# Patient Record
Sex: FEMALE | Race: WHITE | NOT HISPANIC OR LATINO | Employment: OTHER | URBAN - METROPOLITAN AREA
[De-identification: names, ages, dates, MRNs, and addresses within clinical notes are randomized per-mention and may not be internally consistent; named-entity substitution may affect disease eponyms.]

---

## 2023-11-02 ENCOUNTER — APPOINTMENT (OUTPATIENT)
Dept: RADIOLOGY | Facility: HOSPITAL | Age: 88
End: 2023-11-02
Payer: MEDICARE

## 2023-11-02 ENCOUNTER — HOSPITAL ENCOUNTER (EMERGENCY)
Facility: HOSPITAL | Age: 88
Discharge: HOME | End: 2023-11-02
Attending: STUDENT IN AN ORGANIZED HEALTH CARE EDUCATION/TRAINING PROGRAM
Payer: MEDICARE

## 2023-11-02 VITALS
TEMPERATURE: 98.2 F | HEART RATE: 73 BPM | WEIGHT: 155 LBS | OXYGEN SATURATION: 98 % | DIASTOLIC BLOOD PRESSURE: 77 MMHG | SYSTOLIC BLOOD PRESSURE: 151 MMHG | BODY MASS INDEX: 28.52 KG/M2 | RESPIRATION RATE: 16 BRPM | HEIGHT: 62 IN

## 2023-11-02 DIAGNOSIS — R10.9 FLANK PAIN: Primary | ICD-10-CM

## 2023-11-02 LAB
ALBUMIN SERPL BCP-MCNC: 4.1 G/DL (ref 3.4–5)
ALP SERPL-CCNC: 64 U/L (ref 33–136)
ALT SERPL W P-5'-P-CCNC: 13 U/L (ref 7–45)
ANION GAP SERPL CALC-SCNC: 12 MMOL/L (ref 10–20)
APPEARANCE UR: CLEAR
AST SERPL W P-5'-P-CCNC: 15 U/L (ref 9–39)
BASOPHILS # BLD AUTO: 0.05 X10*3/UL (ref 0–0.1)
BASOPHILS NFR BLD AUTO: 0.6 %
BILIRUB SERPL-MCNC: 0.3 MG/DL (ref 0–1.2)
BILIRUB UR STRIP.AUTO-MCNC: NEGATIVE MG/DL
BUN SERPL-MCNC: 20 MG/DL (ref 6–23)
CALCIUM SERPL-MCNC: 9.9 MG/DL (ref 8.6–10.3)
CARDIAC TROPONIN I PNL SERPL HS: 10 NG/L (ref 0–13)
CHLORIDE SERPL-SCNC: 99 MMOL/L (ref 98–107)
CO2 SERPL-SCNC: 29 MMOL/L (ref 21–32)
COLOR UR: ABNORMAL
CREAT SERPL-MCNC: 1.2 MG/DL (ref 0.5–1.05)
EOSINOPHIL # BLD AUTO: 0.1 X10*3/UL (ref 0–0.4)
EOSINOPHIL NFR BLD AUTO: 1.1 %
ERYTHROCYTE [DISTWIDTH] IN BLOOD BY AUTOMATED COUNT: 13.3 % (ref 11.5–14.5)
GFR SERPL CREATININE-BSD FRML MDRD: 44 ML/MIN/1.73M*2
GLUCOSE SERPL-MCNC: 230 MG/DL (ref 74–99)
GLUCOSE UR STRIP.AUTO-MCNC: ABNORMAL MG/DL
HCT VFR BLD AUTO: 38.1 % (ref 36–46)
HGB BLD-MCNC: 12.4 G/DL (ref 12–16)
HOLD SPECIMEN: NORMAL
IMM GRANULOCYTES # BLD AUTO: 0.03 X10*3/UL (ref 0–0.5)
IMM GRANULOCYTES NFR BLD AUTO: 0.3 % (ref 0–0.9)
INR PPP: 1 (ref 0.9–1.1)
KETONES UR STRIP.AUTO-MCNC: NEGATIVE MG/DL
LACTATE SERPL-SCNC: 1.7 MMOL/L (ref 0.4–2)
LEUKOCYTE ESTERASE UR QL STRIP.AUTO: NEGATIVE
LIPASE SERPL-CCNC: 95 U/L (ref 9–82)
LYMPHOCYTES # BLD AUTO: 3.41 X10*3/UL (ref 0.8–3)
LYMPHOCYTES NFR BLD AUTO: 38.9 %
MCH RBC QN AUTO: 28.6 PG (ref 26–34)
MCHC RBC AUTO-ENTMCNC: 32.5 G/DL (ref 32–36)
MCV RBC AUTO: 88 FL (ref 80–100)
MONOCYTES # BLD AUTO: 0.88 X10*3/UL (ref 0.05–0.8)
MONOCYTES NFR BLD AUTO: 10 %
NEUTROPHILS # BLD AUTO: 4.29 X10*3/UL (ref 1.6–5.5)
NEUTROPHILS NFR BLD AUTO: 49.1 %
NITRITE UR QL STRIP.AUTO: NEGATIVE
NRBC BLD-RTO: 0 /100 WBCS (ref 0–0)
PH UR STRIP.AUTO: 6 [PH]
PLATELET # BLD AUTO: 293 X10*3/UL (ref 150–450)
POTASSIUM SERPL-SCNC: 3.8 MMOL/L (ref 3.5–5.3)
PROT SERPL-MCNC: 7 G/DL (ref 6.4–8.2)
PROT UR STRIP.AUTO-MCNC: NEGATIVE MG/DL
PROTHROMBIN TIME: 11.1 SECONDS (ref 9.8–12.8)
RBC # BLD AUTO: 4.33 X10*6/UL (ref 4–5.2)
RBC # UR STRIP.AUTO: NEGATIVE /UL
SODIUM SERPL-SCNC: 136 MMOL/L (ref 136–145)
SP GR UR STRIP.AUTO: 1
UROBILINOGEN UR STRIP.AUTO-MCNC: <2 MG/DL
WBC # BLD AUTO: 8.8 X10*3/UL (ref 4.4–11.3)

## 2023-11-02 PROCEDURE — 83605 ASSAY OF LACTIC ACID: CPT | Performed by: PHYSICIAN ASSISTANT

## 2023-11-02 PROCEDURE — 85025 COMPLETE CBC W/AUTO DIFF WBC: CPT | Performed by: PHYSICIAN ASSISTANT

## 2023-11-02 PROCEDURE — 36415 COLL VENOUS BLD VENIPUNCTURE: CPT | Performed by: PHYSICIAN ASSISTANT

## 2023-11-02 PROCEDURE — 96374 THER/PROPH/DIAG INJ IV PUSH: CPT | Mod: 59

## 2023-11-02 PROCEDURE — 2500000004 HC RX 250 GENERAL PHARMACY W/ HCPCS (ALT 636 FOR OP/ED): Performed by: PHYSICIAN ASSISTANT

## 2023-11-02 PROCEDURE — 81003 URINALYSIS AUTO W/O SCOPE: CPT | Performed by: PHYSICIAN ASSISTANT

## 2023-11-02 PROCEDURE — 74177 CT ABD & PELVIS W/CONTRAST: CPT

## 2023-11-02 PROCEDURE — 99284 EMERGENCY DEPT VISIT MOD MDM: CPT | Mod: 25 | Performed by: STUDENT IN AN ORGANIZED HEALTH CARE EDUCATION/TRAINING PROGRAM

## 2023-11-02 PROCEDURE — 80053 COMPREHEN METABOLIC PANEL: CPT | Performed by: PHYSICIAN ASSISTANT

## 2023-11-02 PROCEDURE — 2550000001 HC RX 255 CONTRASTS: Performed by: PHYSICIAN ASSISTANT

## 2023-11-02 PROCEDURE — 84484 ASSAY OF TROPONIN QUANT: CPT | Performed by: PHYSICIAN ASSISTANT

## 2023-11-02 PROCEDURE — 83690 ASSAY OF LIPASE: CPT | Performed by: PHYSICIAN ASSISTANT

## 2023-11-02 PROCEDURE — 85610 PROTHROMBIN TIME: CPT | Performed by: PHYSICIAN ASSISTANT

## 2023-11-02 PROCEDURE — 74177 CT ABD & PELVIS W/CONTRAST: CPT | Mod: FOREIGN READ | Performed by: RADIOLOGY

## 2023-11-02 PROCEDURE — 96375 TX/PRO/DX INJ NEW DRUG ADDON: CPT

## 2023-11-02 PROCEDURE — 96361 HYDRATE IV INFUSION ADD-ON: CPT

## 2023-11-02 RX ORDER — VALACYCLOVIR HYDROCHLORIDE 1 G/1
1000 TABLET, FILM COATED ORAL 3 TIMES DAILY
Qty: 21 TABLET | Refills: 0 | Status: SHIPPED | OUTPATIENT
Start: 2023-11-02 | End: 2023-11-09

## 2023-11-02 RX ORDER — ONDANSETRON HYDROCHLORIDE 2 MG/ML
4 INJECTION, SOLUTION INTRAVENOUS ONCE
Status: DISCONTINUED | OUTPATIENT
Start: 2023-11-02 | End: 2023-11-02 | Stop reason: HOSPADM

## 2023-11-02 RX ORDER — LIDOCAINE 50 MG/G
1 PATCH TOPICAL DAILY
Qty: 5 PATCH | Refills: 0 | Status: SHIPPED | OUTPATIENT
Start: 2023-11-02 | End: 2023-11-07

## 2023-11-02 RX ORDER — HYDROCODONE BITARTRATE AND ACETAMINOPHEN 5; 325 MG/1; MG/1
1 TABLET ORAL 3 TIMES DAILY PRN
Qty: 9 TABLET | Refills: 0 | Status: SHIPPED | OUTPATIENT
Start: 2023-11-02 | End: 2023-11-05

## 2023-11-02 RX ORDER — LORAZEPAM 2 MG/ML
0.25 INJECTION INTRAMUSCULAR ONCE
Status: COMPLETED | OUTPATIENT
Start: 2023-11-02 | End: 2023-11-02

## 2023-11-02 RX ORDER — MORPHINE SULFATE 4 MG/ML
4 INJECTION INTRAVENOUS ONCE
Status: COMPLETED | OUTPATIENT
Start: 2023-11-02 | End: 2023-11-02

## 2023-11-02 RX ADMIN — SODIUM CHLORIDE 1000 ML: 9 INJECTION, SOLUTION INTRAVENOUS at 17:13

## 2023-11-02 RX ADMIN — MORPHINE SULFATE 4 MG: 4 INJECTION INTRAVENOUS at 17:10

## 2023-11-02 RX ADMIN — LORAZEPAM 0.26 MG: 2 INJECTION INTRAMUSCULAR; INTRAVENOUS at 17:10

## 2023-11-02 RX ADMIN — IOHEXOL 75 ML: 350 INJECTION, SOLUTION INTRAVENOUS at 18:32

## 2023-11-02 ASSESSMENT — LIFESTYLE VARIABLES
HAVE PEOPLE ANNOYED YOU BY CRITICIZING YOUR DRINKING: NO
EVER FELT BAD OR GUILTY ABOUT YOUR DRINKING: NO
HAVE YOU EVER FELT YOU SHOULD CUT DOWN ON YOUR DRINKING: NO
EVER HAD A DRINK FIRST THING IN THE MORNING TO STEADY YOUR NERVES TO GET RID OF A HANGOVER: NO
REASON UNABLE TO ASSESS: NO

## 2023-11-02 ASSESSMENT — PAIN SCALES - GENERAL: PAINLEVEL_OUTOF10: 9

## 2023-11-02 ASSESSMENT — COLUMBIA-SUICIDE SEVERITY RATING SCALE - C-SSRS
1. IN THE PAST MONTH, HAVE YOU WISHED YOU WERE DEAD OR WISHED YOU COULD GO TO SLEEP AND NOT WAKE UP?: NO
2. HAVE YOU ACTUALLY HAD ANY THOUGHTS OF KILLING YOURSELF?: NO
6. HAVE YOU EVER DONE ANYTHING, STARTED TO DO ANYTHING, OR PREPARED TO DO ANYTHING TO END YOUR LIFE?: NO

## 2023-11-02 ASSESSMENT — PAIN - FUNCTIONAL ASSESSMENT: PAIN_FUNCTIONAL_ASSESSMENT: 0-10

## 2023-11-03 NOTE — ED PROVIDER NOTES
HPI   Chief Complaint   Patient presents with    Flank Pain     Pt presents to the ER with flank pain that has been going on since Sunday. Pt states that the pain is a 10/10. Pain comes and goes. Pt states that she hasn't had any trouble with urination. PT has had diarrhea.        This is a 88-year-old female with PMH CAD on Plavix, DM presenting for evaluation of atraumatic left-sided flank pain x4 days.  No obvious aggravating or alleviating factors.  Radiates on horizontal plane from the back to the anterior abdomen.  States it feels similar to prior episodes of having shingles but there is no rash. Denies fevers, chills, chest pain, shortness of breath, nausea, vomiting, diarrhea, constipation, hematochezia, melena, urinary symptoms, vaginal symptoms.                          [place] Coma Scale Score: 15                  Patient History   No past medical history on file.  Past Surgical History:   Procedure Laterality Date    OTHER SURGICAL HISTORY  11/30/2018    Coronary artery bypass graft    OTHER SURGICAL HISTORY  11/30/2018    Cardiac catheterization     No family history on file.  Social History     Tobacco Use    Smoking status: Not on file    Smokeless tobacco: Not on file   Substance Use Topics    Alcohol use: Not on file    Drug use: Not on file       Physical Exam   ED Triage Vitals [11/02/23 1619]   Temp Heart Rate Resp BP   36.8 °C (98.2 °F) 89 18 136/68      SpO2 Temp Source Heart Rate Source Patient Position   98 % Tympanic Monitor Sitting      BP Location FiO2 (%)     Left arm --       Physical Exam    General: Vitals noted. Afebrile. No apparent distress  EENT: Sclerae anicteric  Cardiac: Regular rate and rhythm.  Grade 2/6 systolic murmur heard best at the LSB.  Pulmonary: Lungs clear bilaterally with good aeration  Abdomen: Soft. Nontender. No rebound. No guarding  : No CVA tenderness. exam deferred  Extremities: No peripheral edema  Skin: No rash on abdomen  Neuro: No focal neurologic  deficits    ED Course & MDM   ED Course as of 11/02/23 2118   Thu Nov 02, 2023 1916 CT abdomen pelvis w IV contrast [BW]      ED Course User Index  [BW] Amado Turpin PA-C         Diagnoses as of 11/02/23 2118   Flank pain       Medical Decision Making  DDx: Ureterolithiasis, retroperitoneal abscess, colitis, obstruction, pyelonephritis, atypical cardiac presentation  EKG Interpreted by me: NSR.  Rate 82.  RBBB.  Bifascicular block.  QTc 479 ms.  LAD.  No STEMI.    88-year-old female with PMH CAD on Plavix, DM presenting for evaluation of atraumatic left-sided flank pain x4 days.  Visibly nontoxic-appearing no apparent distress.  No reproducible tenderness on exam.  Vital signs stable.  Patient was given IV normal saline, IV morphine, IV Zofran.  Was also given Ativan out of concern for anxiety about her scan.  Lipase is nonspecifically elevated at 95.  Creatinine 1.2.  Glucose 230 without any evidence of DKA negative troponin.  Unremarkable urinalysis.  CBC shows no leukocytosis or anemia.  CT scan shows small amount of subcutaneous edema in the subcutaneous tissues of the left and right the midline in the upper pelvis but otherwise no acute abnormalities noted by the radiologist.  These are likely secondary to insulin injections in the abdomen.  Unclear if this is herpetic pain presenting before the typical shingles rash so the patient will be treated with Valtrex and also given lidocaine patches and Norco for pain control.  Follow-up with PCP.  Of course return to the nearest emergency department if any concerns or new or worsening symptoms.  This visit was staffed with the attending physician Dr. Finley.    Impression: See diagnosis    Disposition: Discharge      Disclaimer: This note was dictated using speech recognition software. An attempt at proofreading was made to minimize errors. Minor errors in transcription may be present. Please call if questions.        Procedure  Procedures     Amado Turpin  JONA  11/02/23 2123

## 2025-08-05 ENCOUNTER — APPOINTMENT (OUTPATIENT)
Dept: RADIOLOGY | Facility: HOSPITAL | Age: OVER 89
DRG: 372 | End: 2025-08-05
Payer: MEDICARE

## 2025-08-05 ENCOUNTER — HOSPITAL ENCOUNTER (INPATIENT)
Facility: HOSPITAL | Age: OVER 89
End: 2025-08-05
Attending: EMERGENCY MEDICINE | Admitting: INTERNAL MEDICINE
Payer: MEDICARE

## 2025-08-05 DIAGNOSIS — L02.91 ABSCESS: ICD-10-CM

## 2025-08-05 DIAGNOSIS — E87.1 HYPONATREMIA: ICD-10-CM

## 2025-08-05 DIAGNOSIS — K35.32 PERFORATED APPENDICITIS: ICD-10-CM

## 2025-08-05 DIAGNOSIS — R10.30 LOWER ABDOMINAL PAIN: Primary | ICD-10-CM

## 2025-08-05 DIAGNOSIS — K35.32 APPENDICITIS WITH PERFORATION: ICD-10-CM

## 2025-08-05 PROBLEM — E11.39 TYPE 2 DIABETES MELLITUS WITH OPHTHALMIC COMPLICATION (MULTI): Chronic | Status: ACTIVE | Noted: 2025-08-05

## 2025-08-05 LAB
ABO GROUP (TYPE) IN BLOOD: NORMAL
ABO GROUP (TYPE) IN BLOOD: NORMAL
ALBUMIN SERPL BCP-MCNC: 3.5 G/DL (ref 3.4–5)
ALP SERPL-CCNC: 63 U/L (ref 33–136)
ALT SERPL W P-5'-P-CCNC: 8 U/L (ref 7–45)
ANION GAP SERPL CALC-SCNC: 15 MMOL/L (ref 10–20)
ANTIBODY SCREEN: NORMAL
APPEARANCE UR: CLEAR
AST SERPL W P-5'-P-CCNC: 10 U/L (ref 9–39)
BASOPHILS # BLD AUTO: 0.03 X10*3/UL (ref 0–0.1)
BASOPHILS NFR BLD AUTO: 0.2 %
BILIRUB SERPL-MCNC: 0.7 MG/DL (ref 0–1.2)
BILIRUB UR STRIP.AUTO-MCNC: NEGATIVE MG/DL
BUN SERPL-MCNC: 24 MG/DL (ref 6–23)
CALCIUM SERPL-MCNC: 9.2 MG/DL (ref 8.6–10.3)
CHLORIDE SERPL-SCNC: 91 MMOL/L (ref 98–107)
CO2 SERPL-SCNC: 24 MMOL/L (ref 21–32)
COLOR UR: YELLOW
CREAT SERPL-MCNC: 0.97 MG/DL (ref 0.5–1.05)
EGFRCR SERPLBLD CKD-EPI 2021: 56 ML/MIN/1.73M*2
EOSINOPHIL # BLD AUTO: 0.04 X10*3/UL (ref 0–0.4)
EOSINOPHIL NFR BLD AUTO: 0.2 %
ERYTHROCYTE [DISTWIDTH] IN BLOOD BY AUTOMATED COUNT: 13.1 % (ref 11.5–14.5)
GLUCOSE BLD MANUAL STRIP-MCNC: 252 MG/DL (ref 74–99)
GLUCOSE BLD MANUAL STRIP-MCNC: 312 MG/DL (ref 74–99)
GLUCOSE SERPL-MCNC: 379 MG/DL (ref 74–99)
GLUCOSE UR STRIP.AUTO-MCNC: ABNORMAL MG/DL
HCT VFR BLD AUTO: 40 % (ref 36–46)
HGB BLD-MCNC: 13.5 G/DL (ref 12–16)
IMM GRANULOCYTES # BLD AUTO: 0.21 X10*3/UL (ref 0–0.5)
IMM GRANULOCYTES NFR BLD AUTO: 1.3 % (ref 0–0.9)
INR PPP: 1.1 (ref 0.9–1.1)
KETONES UR STRIP.AUTO-MCNC: ABNORMAL MG/DL
LACTATE SERPL-SCNC: 2.3 MMOL/L (ref 0.4–2)
LEUKOCYTE ESTERASE UR QL STRIP.AUTO: NEGATIVE
LYMPHOCYTES # BLD AUTO: 1.35 X10*3/UL (ref 0.8–3)
LYMPHOCYTES NFR BLD AUTO: 8.2 %
MCH RBC QN AUTO: 29.2 PG (ref 26–34)
MCHC RBC AUTO-ENTMCNC: 33.8 G/DL (ref 32–36)
MCV RBC AUTO: 87 FL (ref 80–100)
MONOCYTES # BLD AUTO: 0.96 X10*3/UL (ref 0.05–0.8)
MONOCYTES NFR BLD AUTO: 5.8 %
MUCOUS THREADS #/AREA URNS AUTO: ABNORMAL /LPF
NEUTROPHILS # BLD AUTO: 13.94 X10*3/UL (ref 1.6–5.5)
NEUTROPHILS NFR BLD AUTO: 84.3 %
NITRITE UR QL STRIP.AUTO: NEGATIVE
NRBC BLD-RTO: 0 /100 WBCS (ref 0–0)
PH UR STRIP.AUTO: 6 [PH]
PLATELET # BLD AUTO: 297 X10*3/UL (ref 150–450)
POTASSIUM SERPL-SCNC: 4.2 MMOL/L (ref 3.5–5.3)
PROT SERPL-MCNC: 6.9 G/DL (ref 6.4–8.2)
PROT UR STRIP.AUTO-MCNC: ABNORMAL MG/DL
PROTHROMBIN TIME: 11.7 SECONDS (ref 9.8–12.4)
RBC # BLD AUTO: 4.62 X10*6/UL (ref 4–5.2)
RBC # UR STRIP.AUTO: ABNORMAL MG/DL
RBC #/AREA URNS AUTO: ABNORMAL /HPF
RH FACTOR (ANTIGEN D): NORMAL
RH FACTOR (ANTIGEN D): NORMAL
SODIUM SERPL-SCNC: 126 MMOL/L (ref 136–145)
SP GR UR STRIP.AUTO: 1.02
SQUAMOUS #/AREA URNS AUTO: ABNORMAL /HPF
UROBILINOGEN UR STRIP.AUTO-MCNC: ABNORMAL MG/DL
WBC # BLD AUTO: 16.5 X10*3/UL (ref 4.4–11.3)
WBC #/AREA URNS AUTO: ABNORMAL /HPF
WBC CLUMPS #/AREA URNS AUTO: ABNORMAL /HPF

## 2025-08-05 PROCEDURE — 81001 URINALYSIS AUTO W/SCOPE: CPT | Performed by: INTERNAL MEDICINE

## 2025-08-05 PROCEDURE — 83605 ASSAY OF LACTIC ACID: CPT | Performed by: NURSE PRACTITIONER

## 2025-08-05 PROCEDURE — 2500000004 HC RX 250 GENERAL PHARMACY W/ HCPCS (ALT 636 FOR OP/ED): Performed by: NURSE PRACTITIONER

## 2025-08-05 PROCEDURE — 2500000002 HC RX 250 W HCPCS SELF ADMINISTERED DRUGS (ALT 637 FOR MEDICARE OP, ALT 636 FOR OP/ED): Performed by: INTERNAL MEDICINE

## 2025-08-05 PROCEDURE — 2550000001 HC RX 255 CONTRASTS: Performed by: EMERGENCY MEDICINE

## 2025-08-05 PROCEDURE — 96375 TX/PRO/DX INJ NEW DRUG ADDON: CPT

## 2025-08-05 PROCEDURE — 74177 CT ABD & PELVIS W/CONTRAST: CPT | Performed by: RADIOLOGY

## 2025-08-05 PROCEDURE — 82947 ASSAY GLUCOSE BLOOD QUANT: CPT

## 2025-08-05 PROCEDURE — 2500000004 HC RX 250 GENERAL PHARMACY W/ HCPCS (ALT 636 FOR OP/ED): Performed by: INTERNAL MEDICINE

## 2025-08-05 PROCEDURE — 2500000001 HC RX 250 WO HCPCS SELF ADMINISTERED DRUGS (ALT 637 FOR MEDICARE OP): Performed by: INTERNAL MEDICINE

## 2025-08-05 PROCEDURE — 85610 PROTHROMBIN TIME: CPT | Performed by: NURSE PRACTITIONER

## 2025-08-05 PROCEDURE — 36415 COLL VENOUS BLD VENIPUNCTURE: CPT | Performed by: NURSE PRACTITIONER

## 2025-08-05 PROCEDURE — 71045 X-RAY EXAM CHEST 1 VIEW: CPT | Performed by: STUDENT IN AN ORGANIZED HEALTH CARE EDUCATION/TRAINING PROGRAM

## 2025-08-05 PROCEDURE — 86901 BLOOD TYPING SEROLOGIC RH(D): CPT | Performed by: NURSE PRACTITIONER

## 2025-08-05 PROCEDURE — 74177 CT ABD & PELVIS W/CONTRAST: CPT

## 2025-08-05 PROCEDURE — 99223 1ST HOSP IP/OBS HIGH 75: CPT | Performed by: INTERNAL MEDICINE

## 2025-08-05 PROCEDURE — 85025 COMPLETE CBC W/AUTO DIFF WBC: CPT | Performed by: NURSE PRACTITIONER

## 2025-08-05 PROCEDURE — 1100000001 HC PRIVATE ROOM DAILY

## 2025-08-05 PROCEDURE — 71045 X-RAY EXAM CHEST 1 VIEW: CPT

## 2025-08-05 PROCEDURE — 96365 THER/PROPH/DIAG IV INF INIT: CPT

## 2025-08-05 PROCEDURE — 80053 COMPREHEN METABOLIC PANEL: CPT | Performed by: NURSE PRACTITIONER

## 2025-08-05 PROCEDURE — 96367 TX/PROPH/DG ADDL SEQ IV INF: CPT

## 2025-08-05 PROCEDURE — 99285 EMERGENCY DEPT VISIT HI MDM: CPT | Mod: 25 | Performed by: EMERGENCY MEDICINE

## 2025-08-05 RX ORDER — ENOXAPARIN SODIUM 100 MG/ML
40 INJECTION SUBCUTANEOUS DAILY
Status: DISPENSED | OUTPATIENT
Start: 2025-08-05

## 2025-08-05 RX ORDER — LEVOTHYROXINE SODIUM 75 UG/1
75 TABLET ORAL DAILY
Status: DISPENSED | OUTPATIENT
Start: 2025-08-06

## 2025-08-05 RX ORDER — DEXTROSE 50 % IN WATER (D50W) INTRAVENOUS SYRINGE
25
Status: DISCONTINUED | OUTPATIENT
Start: 2025-08-05 | End: 2025-08-05

## 2025-08-05 RX ORDER — ROSUVASTATIN CALCIUM 5 MG/1
5 TABLET, COATED ORAL DAILY
Status: ON HOLD | COMMUNITY

## 2025-08-05 RX ORDER — INSULIN GLARGINE-YFGN 100 [IU]/ML
20 INJECTION, SOLUTION SUBCUTANEOUS NIGHTLY
Status: DISCONTINUED | OUTPATIENT
Start: 2025-08-05 | End: 2025-08-06

## 2025-08-05 RX ORDER — MORPHINE SULFATE 4 MG/ML
3 INJECTION INTRAVENOUS
Status: DISCONTINUED | OUTPATIENT
Start: 2025-08-05 | End: 2025-08-09

## 2025-08-05 RX ORDER — METRONIDAZOLE 500 MG/100ML
500 INJECTION, SOLUTION INTRAVENOUS ONCE
Status: COMPLETED | OUTPATIENT
Start: 2025-08-05 | End: 2025-08-05

## 2025-08-05 RX ORDER — MORPHINE SULFATE 4 MG/ML
4 INJECTION INTRAVENOUS ONCE
Status: COMPLETED | OUTPATIENT
Start: 2025-08-05 | End: 2025-08-05

## 2025-08-05 RX ORDER — SODIUM CHLORIDE 9 MG/ML
90 INJECTION, SOLUTION INTRAVENOUS CONTINUOUS
Status: DISCONTINUED | OUTPATIENT
Start: 2025-08-05 | End: 2025-08-06

## 2025-08-05 RX ORDER — SODIUM CHLORIDE 9 MG/ML
90 INJECTION, SOLUTION INTRAVENOUS CONTINUOUS
Status: DISCONTINUED | OUTPATIENT
Start: 2025-08-05 | End: 2025-08-05

## 2025-08-05 RX ORDER — INSULIN GLARGINE 100 [IU]/ML
34 INJECTION, SOLUTION SUBCUTANEOUS NIGHTLY
Status: ON HOLD | COMMUNITY

## 2025-08-05 RX ORDER — CEFEPIME HYDROCHLORIDE 1 G/50ML
1 INJECTION, SOLUTION INTRAVENOUS EVERY 8 HOURS SCHEDULED
Status: DISPENSED | OUTPATIENT
Start: 2025-08-05

## 2025-08-05 RX ORDER — DEXTROSE 50 % IN WATER (D50W) INTRAVENOUS SYRINGE
12.5
Status: DISCONTINUED | OUTPATIENT
Start: 2025-08-05 | End: 2025-08-05

## 2025-08-05 RX ORDER — ONDANSETRON HYDROCHLORIDE 2 MG/ML
4 INJECTION, SOLUTION INTRAVENOUS EVERY 4 HOURS PRN
Status: DISPENSED | OUTPATIENT
Start: 2025-08-05

## 2025-08-05 RX ORDER — MORPHINE SULFATE 2 MG/ML
2 INJECTION, SOLUTION INTRAMUSCULAR; INTRAVENOUS
Status: DISCONTINUED | OUTPATIENT
Start: 2025-08-05 | End: 2025-08-05

## 2025-08-05 RX ORDER — CYANOCOBALAMIN (VITAMIN B-12) 500 MCG
10 TABLET ORAL DAILY
Status: DISPENSED | OUTPATIENT
Start: 2025-08-05

## 2025-08-05 RX ORDER — ONDANSETRON HYDROCHLORIDE 2 MG/ML
4 INJECTION, SOLUTION INTRAVENOUS ONCE
Status: COMPLETED | OUTPATIENT
Start: 2025-08-05 | End: 2025-08-05

## 2025-08-05 RX ORDER — CYANOCOBALAMIN (VITAMIN B-12) 500 MCG
10 TABLET ORAL DAILY
Status: ON HOLD | COMMUNITY

## 2025-08-05 RX ORDER — CLOPIDOGREL BISULFATE 75 MG/1
75 TABLET ORAL DAILY
Status: ON HOLD | COMMUNITY

## 2025-08-05 RX ORDER — LEVOTHYROXINE SODIUM 75 UG/1
75 TABLET ORAL DAILY
Status: ON HOLD | COMMUNITY

## 2025-08-05 RX ORDER — VALSARTAN 160 MG/1
160 TABLET ORAL DAILY
Status: ON HOLD | COMMUNITY

## 2025-08-05 RX ORDER — INSULIN ASPART 100 [IU]/ML
INJECTION, SOLUTION INTRAVENOUS; SUBCUTANEOUS
Status: ON HOLD | COMMUNITY

## 2025-08-05 RX ORDER — VALSARTAN 160 MG/1
160 TABLET ORAL DAILY
Status: DISCONTINUED | OUTPATIENT
Start: 2025-08-05 | End: 2025-08-07

## 2025-08-05 RX ORDER — INSULIN LISPRO 100 [IU]/ML
0-10 INJECTION, SOLUTION INTRAVENOUS; SUBCUTANEOUS
Status: DISCONTINUED | OUTPATIENT
Start: 2025-08-05 | End: 2025-08-09

## 2025-08-05 RX ADMIN — MORPHINE SULFATE 3 MG: 4 INJECTION INTRAVENOUS at 18:46

## 2025-08-05 RX ADMIN — SODIUM CHLORIDE 1000 ML: 0.9 INJECTION, SOLUTION INTRAVENOUS at 11:12

## 2025-08-05 RX ADMIN — CEFEPIME HYDROCHLORIDE 1 G: 1 INJECTION, SOLUTION INTRAVENOUS at 10:57

## 2025-08-05 RX ADMIN — IOHEXOL 75 ML: 350 INJECTION, SOLUTION INTRAVENOUS at 13:32

## 2025-08-05 RX ADMIN — CHOLECALCIFEROL (VITAMIN D3) 10 MCG (400 UNIT) TABLET 10 MCG: at 16:53

## 2025-08-05 RX ADMIN — INSULIN LISPRO 8 UNITS: 100 INJECTION, SOLUTION INTRAVENOUS; SUBCUTANEOUS at 16:59

## 2025-08-05 RX ADMIN — VALSARTAN 160 MG: 160 TABLET, FILM COATED ORAL at 16:53

## 2025-08-05 RX ADMIN — MORPHINE SULFATE 3 MG: 4 INJECTION INTRAVENOUS at 23:07

## 2025-08-05 RX ADMIN — METRONIDAZOLE 500 MG: 500 SOLUTION INTRAVENOUS at 11:45

## 2025-08-05 RX ADMIN — CEFEPIME HYDROCHLORIDE 1 G: 1 INJECTION, SOLUTION INTRAVENOUS at 21:55

## 2025-08-05 RX ADMIN — INSULIN GLARGINE-YFGN 20 UNITS: 100 INJECTION, SOLUTION SUBCUTANEOUS at 21:55

## 2025-08-05 RX ADMIN — ONDANSETRON 4 MG: 2 INJECTION, SOLUTION INTRAMUSCULAR; INTRAVENOUS at 11:11

## 2025-08-05 RX ADMIN — SODIUM CHLORIDE 90 ML/HR: 0.9 INJECTION, SOLUTION INTRAVENOUS at 16:52

## 2025-08-05 RX ADMIN — MORPHINE SULFATE 4 MG: 4 INJECTION INTRAVENOUS at 11:11

## 2025-08-05 SDOH — ECONOMIC STABILITY: INCOME INSECURITY: IN THE PAST 12 MONTHS HAS THE ELECTRIC, GAS, OIL, OR WATER COMPANY THREATENED TO SHUT OFF SERVICES IN YOUR HOME?: NO

## 2025-08-05 SDOH — SOCIAL STABILITY: SOCIAL INSECURITY: WITHIN THE LAST YEAR, HAVE YOU BEEN HUMILIATED OR EMOTIONALLY ABUSED IN OTHER WAYS BY YOUR PARTNER OR EX-PARTNER?: NO

## 2025-08-05 SDOH — HEALTH STABILITY: MENTAL HEALTH: HOW OFTEN DO YOU HAVE A DRINK CONTAINING ALCOHOL?: NEVER

## 2025-08-05 SDOH — HEALTH STABILITY: MENTAL HEALTH: HOW OFTEN DO YOU HAVE SIX OR MORE DRINKS ON ONE OCCASION?: NEVER

## 2025-08-05 SDOH — ECONOMIC STABILITY: FOOD INSECURITY: WITHIN THE PAST 12 MONTHS, THE FOOD YOU BOUGHT JUST DIDN'T LAST AND YOU DIDN'T HAVE MONEY TO GET MORE.: NEVER TRUE

## 2025-08-05 SDOH — SOCIAL STABILITY: SOCIAL INSECURITY: WITHIN THE LAST YEAR, HAVE YOU BEEN AFRAID OF YOUR PARTNER OR EX-PARTNER?: NO

## 2025-08-05 SDOH — SOCIAL STABILITY: SOCIAL INSECURITY: ARE THERE ANY APPARENT SIGNS OF INJURIES/BEHAVIORS THAT COULD BE RELATED TO ABUSE/NEGLECT?: NO

## 2025-08-05 SDOH — SOCIAL STABILITY: SOCIAL INSECURITY
WITHIN THE LAST YEAR, HAVE YOU BEEN RAPED OR FORCED TO HAVE ANY KIND OF SEXUAL ACTIVITY BY YOUR PARTNER OR EX-PARTNER?: NO

## 2025-08-05 SDOH — SOCIAL STABILITY: SOCIAL INSECURITY: WERE YOU ABLE TO COMPLETE ALL THE BEHAVIORAL HEALTH SCREENINGS?: YES

## 2025-08-05 SDOH — HEALTH STABILITY: MENTAL HEALTH: HOW MANY DRINKS CONTAINING ALCOHOL DO YOU HAVE ON A TYPICAL DAY WHEN YOU ARE DRINKING?: PATIENT DOES NOT DRINK

## 2025-08-05 SDOH — ECONOMIC STABILITY: FOOD INSECURITY: WITHIN THE PAST 12 MONTHS, YOU WORRIED THAT YOUR FOOD WOULD RUN OUT BEFORE YOU GOT THE MONEY TO BUY MORE.: NEVER TRUE

## 2025-08-05 SDOH — SOCIAL STABILITY: SOCIAL INSECURITY: HAVE YOU HAD ANY THOUGHTS OF HARMING ANYONE ELSE?: NO

## 2025-08-05 SDOH — SOCIAL STABILITY: SOCIAL INSECURITY: HAVE YOU HAD THOUGHTS OF HARMING ANYONE ELSE?: NO

## 2025-08-05 SDOH — SOCIAL STABILITY: SOCIAL INSECURITY: HAS ANYONE EVER THREATENED TO HURT YOUR FAMILY OR YOUR PETS?: NO

## 2025-08-05 SDOH — SOCIAL STABILITY: SOCIAL INSECURITY: DO YOU FEEL ANYONE HAS EXPLOITED OR TAKEN ADVANTAGE OF YOU FINANCIALLY OR OF YOUR PERSONAL PROPERTY?: NO

## 2025-08-05 SDOH — SOCIAL STABILITY: SOCIAL INSECURITY
WITHIN THE LAST YEAR, HAVE YOU BEEN KICKED, HIT, SLAPPED, OR OTHERWISE PHYSICALLY HURT BY YOUR PARTNER OR EX-PARTNER?: NO

## 2025-08-05 SDOH — SOCIAL STABILITY: SOCIAL INSECURITY: DOES ANYONE TRY TO KEEP YOU FROM HAVING/CONTACTING OTHER FRIENDS OR DOING THINGS OUTSIDE YOUR HOME?: NO

## 2025-08-05 SDOH — SOCIAL STABILITY: SOCIAL INSECURITY: ARE YOU OR HAVE YOU BEEN THREATENED OR ABUSED PHYSICALLY, EMOTIONALLY, OR SEXUALLY BY ANYONE?: NO

## 2025-08-05 SDOH — SOCIAL STABILITY: SOCIAL INSECURITY: DO YOU FEEL UNSAFE GOING BACK TO THE PLACE WHERE YOU ARE LIVING?: NO

## 2025-08-05 SDOH — SOCIAL STABILITY: SOCIAL INSECURITY: ABUSE: ADULT

## 2025-08-05 ASSESSMENT — ENCOUNTER SYMPTOMS
CARDIOVASCULAR NEGATIVE: 1
PSYCHIATRIC NEGATIVE: 1
ENDOCRINE NEGATIVE: 1
MUSCULOSKELETAL NEGATIVE: 1
FATIGUE: 1
APPETITE CHANGE: 1
VOMITING: 1
ADENOPATHY: 1
ABDOMINAL PAIN: 1
DIARRHEA: 1
RESPIRATORY NEGATIVE: 1
FREQUENCY: 1
CHILLS: 1
WEAKNESS: 1
ACTIVITY CHANGE: 1
ABDOMINAL DISTENTION: 1
NAUSEA: 1

## 2025-08-05 ASSESSMENT — COGNITIVE AND FUNCTIONAL STATUS - GENERAL
CLIMB 3 TO 5 STEPS WITH RAILING: A LITTLE
MOBILITY SCORE: 24
HELP NEEDED FOR BATHING: A LITTLE
DAILY ACTIVITIY SCORE: 18
STANDING UP FROM CHAIR USING ARMS: A LITTLE
MOBILITY SCORE: 20
WALKING IN HOSPITAL ROOM: A LITTLE
DAILY ACTIVITIY SCORE: 24
PERSONAL GROOMING: A LITTLE
EATING MEALS: A LITTLE
MOVING TO AND FROM BED TO CHAIR: A LITTLE
DRESSING REGULAR LOWER BODY CLOTHING: A LITTLE
TOILETING: A LITTLE
DRESSING REGULAR UPPER BODY CLOTHING: A LITTLE
PATIENT BASELINE BEDBOUND: NO

## 2025-08-05 ASSESSMENT — PATIENT HEALTH QUESTIONNAIRE - PHQ9
1. LITTLE INTEREST OR PLEASURE IN DOING THINGS: NOT AT ALL
2. FEELING DOWN, DEPRESSED OR HOPELESS: NOT AT ALL
SUM OF ALL RESPONSES TO PHQ9 QUESTIONS 1 & 2: 0

## 2025-08-05 ASSESSMENT — ACTIVITIES OF DAILY LIVING (ADL)
LACK_OF_TRANSPORTATION: NO
FEEDING YOURSELF: INDEPENDENT
PATIENT'S MEMORY ADEQUATE TO SAFELY COMPLETE DAILY ACTIVITIES?: YES
HEARING - LEFT EAR: HEARING AID
GROOMING: INDEPENDENT
HEARING - RIGHT EAR: HEARING AID
WALKS IN HOME: INDEPENDENT
ADEQUATE_TO_COMPLETE_ADL: YES
TOILETING: NEEDS ASSISTANCE
JUDGMENT_ADEQUATE_SAFELY_COMPLETE_DAILY_ACTIVITIES: YES
DRESSING YOURSELF: INDEPENDENT
BATHING: NEEDS ASSISTANCE

## 2025-08-05 ASSESSMENT — PAIN - FUNCTIONAL ASSESSMENT
PAIN_FUNCTIONAL_ASSESSMENT: 0-10

## 2025-08-05 ASSESSMENT — LIFESTYLE VARIABLES
SKIP TO QUESTIONS 9-10: 1
AUDIT-C TOTAL SCORE: 0

## 2025-08-05 ASSESSMENT — PAIN DESCRIPTION - ORIENTATION: ORIENTATION: RIGHT;LEFT

## 2025-08-05 ASSESSMENT — PAIN DESCRIPTION - PAIN TYPE: TYPE: ACUTE PAIN

## 2025-08-05 ASSESSMENT — PAIN SCALES - GENERAL
PAINLEVEL_OUTOF10: 0 - NO PAIN
PAINLEVEL_OUTOF10: 4
PAINLEVEL_OUTOF10: 6
PAINLEVEL_OUTOF10: 8
PAINLEVEL_OUTOF10: 7

## 2025-08-05 ASSESSMENT — PAIN DESCRIPTION - LOCATION
LOCATION: ABDOMEN
LOCATION: ABDOMEN

## 2025-08-05 NOTE — ED PROVIDER NOTES
EMERGENCY DEPARTMENT ENCOUNTER      Pt Name: Nika Alejandre  MRN: [id number]  Birthdate 1935  Date of evaluation: 8/5/2025  Provider: SUPA Baldwin    CHIEF COMPLAINT       Chief Complaint   Patient presents with    Abdominal Pain       HISTORY OF PRESENT ILLNESS    Nika Alejandre is a 89 y.o. female who presents to the emergency department as directed by her primary care provider for admission for abnormal CT scan.  Patient states that she was seen yesterday by her primary care provider for evaluation of lower abdominal pain that radiates from 1 side to the other that developed on Friday.  She endorses associated loss of appetite, nausea and nonbloody diarrhea.  She attempted over-the-counter medications at home without resolution.  Denies any trauma, fall, injury or anticoagulation use.  Denies any current smoking, drugs or alcohol use.  Denies any fevers or chills.  Denies any additional symptoms or complaints at this time.    Nursing Notes were reviewed.    History provided by patient.  No  used.    REVIEW OF SYSTEMS     ROS is otherwise negative unless stated above.    PAST MEDICAL HISTORY   Medical History[1]    SURGICAL HISTORY     Surgical History[2]    ALLERGIES     Statins-hmg-coa reductase inhibitors, Metformin, and Penicillins    FAMILY HISTORY     Family History[3]     SOCIAL HISTORY     Social History[4]    PHYSICAL EXAM   VS: As documented in the triage note and EMR flowsheet from this visit were reviewed.    GEN: NAD, nontoxic, well-appearing, elderly, resting comfortably in ER cart without difficulty or dyspnea  EYES: PERRLA  HEENT: Airway patent  CARD: RRR, nontender chest, no crepitus deformities, no JVD, no murmurs rubs or gallops ; No edema noted.  Positive pulses bilaterally throughout.  Capillary refill less than 3 seconds.  No abnormal redness, warmth, tenderness or swelling noted to bilateral lower extremities.  PULMONARY: Clear all lung fields. Moving air  well, Nonlabored, no accessory muscle use, able to speak complete sentences  ABDOMEN: Abdomen soft, non-distended, no rebound, no guarding. Bowel sounds normal in all 4 quadrants. No CVA tenderness.  No masses or organomegaly noted.  Right quadrant tenderness to palpation.  : deferred  MUSK: Spine appears normal, range of motion is not limited, no muscle or joint tenderness. Strength 5 out of 5 equal bilaterally throughout.  No step-offs, deformities or additional signs of trauma noted.  No spinal/midline tenderness to palpation  SKIN: Skin normal color for race, warm, dry and intact. No evidence of trauma. No rash noted.  NEURO: Alert and oriented x 3, speech is clear, no obvious deficits noted. No facial droop noted.  GCS 15    DIAGNOSTIC RESULTS   RADIOLOGY:   Non-plain film images such as CT, Ultrasound and MRI are read by the radiologist. Plain radiographic images are visualized and preliminarily interpreted by myself with the below findings: Preop chest x-ray which revealed no acute cardiopulmonary process      Interpretation per the Radiologist below, if available at the time of this note:    CT abdomen pelvis w IV contrast   Final Result   Findings suggestive of perforated appendicitis with free fluid. There   is also question of an abscess between uterus and urinary bladder.        There are distended loops of small bowel likely due to an ileus.        MACRO:   Dr. Connie Kenney discussed the significance and urgency of this   critical finding by telephone with NP RUTHY VARELA on 8/5/2025   at 2:36 pm.  (**-RCF-**) Findings:  See findings.             Signed by: Connie Kenney 8/5/2025 2:38 PM   Dictation workstation:   [id number]      XR chest 1 view   Final Result   No evidence of acute cardiopulmonary process.        MACRO:   None        Signed by: Josefina Garcia 8/5/2025 11:19 AM   Dictation workstation:   [id number]            ED BEDSIDE ULTRASOUND:   Performed by myself - none    LABS:  Labs  Reviewed   CBC WITH AUTO DIFFERENTIAL - Abnormal       Result Value    WBC 16.5 (*)     nRBC 0.0      RBC 4.62      Hemoglobin 13.5      Hematocrit 40.0      MCV 87      MCH 29.2      MCHC 33.8      RDW 13.1      Platelets 297      Neutrophils % 84.3      Immature Granulocytes %, Automated 1.3 (*)     Lymphocytes % 8.2      Monocytes % 5.8      Eosinophils % 0.2      Basophils % 0.2      Neutrophils Absolute 13.94 (*)     Immature Granulocytes Absolute, Automated 0.21      Lymphocytes Absolute 1.35      Monocytes Absolute 0.96 (*)     Eosinophils Absolute 0.04      Basophils Absolute 0.03     COMPREHENSIVE METABOLIC PANEL - Abnormal    Glucose 379 (*)     Sodium 126 (*)     Potassium 4.2      Chloride 91 (*)     Bicarbonate 24      Anion Gap 15      Urea Nitrogen 24 (*)     Creatinine 0.97      eGFR 56 (*)     Calcium 9.2      Albumin 3.5      Alkaline Phosphatase 63      Total Protein 6.9      AST 10      Bilirubin, Total 0.7      ALT 8     LACTATE - Abnormal    Lactate 2.3 (*)     Narrative:     Venipuncture immediately after or during the administration of Metamizole may lead to falsely low results. Testing should be performed immediately prior to Metamizole dosing.   POCT GLUCOSE - Abnormal    POCT Glucose 312 (*)    PROTIME-INR - Normal    Protime 11.7      INR 1.1     TYPE AND SCREEN    ABO TYPE O      Rh TYPE POS      ANTIBODY SCREEN NEG     VERAB/VERIFY ABORH    ABO TYPE O      Rh TYPE POS     URINALYSIS WITH REFLEX MICROSCOPIC   POCT GLUCOSE METER   POCT GLUCOSE METER       All other labs were within normal range or not returned as of this dictation.    EMERGENCY DEPARTMENT COURSE/MDM:   Vitals:    Vitals:    08/05/25 1100 08/05/25 1200 08/05/25 1500 08/05/25 1651   BP: 159/66 144/66 123/67 142/58   BP Location:    Right arm   Patient Position:    Lying   Pulse:    87   Resp:    18   Temp:    36.7 °C (98.1 °F)   TempSrc:    Temporal   SpO2: 99% 99% 95% 96%   Weight:       Height:           I reviewed the  patient's triage vitals.    This is a 89-year-old female presenting for admission for abnormal CT scan and abdominal pain since Friday.  Per report patient had a CT scan performed yesterday which shows acute appendicitis with contained perforation with abscess.  Exam does revealed mild peritonitis.  She is not currently on any antibiotics.  She will be kept n.p.o.   I will reach out to general surgery for recommendations I will initiate her on IV antibiotics for coverage, IV fluids for hydration and she will be given medications for her symptoms.    I did speak with general surgery Dr. Tobias who requested imaging be pushed through into our system before official recommendations can be given.  I did receive a message that CT scan will likely take 7 to 10 days to be pushed through therefore elected to rescan the patient today after shared discussion.    Workup was reviewed.  She does have a leukocytosis and initial lactate of 2.3.  She has a hyponatremia of 126, is not altered or seizing, no indication for hypertonic saline at this time.  Repeat imaging confirmed perforated appendicitis with free fluid, ileus and abscess.  I did speak with both general surgery and interventional radiology regarding this finding and they recommended admission to medicine service for further management, IV antibiotics and abdominal examinations.  She remained hemodynamically stable throughout ED course of care and will be kept NPO.  She is agreeable for admission and is ultimately admitted to medicine service for further management.  ED Course as of 08/05/25 1701 Tue Aug 05, 2025   1039 XR chest 1 view [CF]      ED Course User Index  [CF] Jagruti Nolasco, APRN-CNP         Diagnoses as of 08/05/25 1701   Lower abdominal pain   Perforated appendicitis   Abscess   Hyponatremia       Patient was counseled regarding labs, imaging, likely diagnosis, and plan. All questions were answered.      ------------------------------------------------------------------  EKG Interpretation: N/A    Differential Diagnoses Considered: Perforated appendicitis with abscess, acute intra-abdominal process    Chronic Medical Conditions Significantly Affecting Care: None    External Records Reviewed: I reviewed recent and relevant outside records including: PCP notes, prior discharge summary, previous radiologic studies, HIE, outside hospital workup    Escalation of Care:  Appropriate for admission to general medicine service for further management, Dr. Blankenship    Social Determinants of Health Significantly Affecting Care:  None    Prescription Drug Consideration: N/A    Diagnostic testing considered: Deferred per general surgery    Discussion of Management with Other Providers:   I discussed the patient/results with: Admitting team, general surgery, interventional radiology Dr. Clark      ------------------------------------------------------------------  ED Medications administered this visit:    Medications   cefepime (Maxipime) 1 g in dextrose 5% IV 50 mL (0 g intravenous Stopped 8/5/25 1139)   ondansetron (Zofran) injection 4 mg (has no administration in time range)   insulin lispro injection 0-10 Units (8 Units subcutaneous Given 8/5/25 1659)   cholecalciferol (Vitamin D-3) tablet 10 mcg (10 mcg oral Given 8/5/25 1653)   levothyroxine (Synthroid, Levoxyl) tablet 75 mcg (has no administration in time range)   valsartan (Diovan) tablet 160 mg (160 mg oral Given 8/5/25 1653)   sodium chloride 0.9% infusion (90 mL/hr intravenous New Bag 8/5/25 1652)   morphine injection 3 mg (has no administration in time range)   enoxaparin (Lovenox) syringe 40 mg (40 mg subcutaneous Not Given 8/5/25 1700)   insulin glargine-yfgn (Semglee) injection 20 Units (has no administration in time range)   metroNIDAZOLE (Flagyl) 500 mg in sodium chloride (iso)  mL (0 mg intravenous Stopped 8/5/25 3525)   sodium chloride 0.9 % bolus 1,000  mL (0 mL intravenous Stopped 8/5/25 1223)   ondansetron (Zofran) injection 4 mg (4 mg intravenous Given 8/5/25 1111)   morphine injection 4 mg (4 mg intravenous Given 8/5/25 1111)   iohexol (OMNIPaque) 350 mg iodine/mL solution 75 mL (75 mL intravenous Given 8/5/25 1332)       New Prescriptions from this visit:    Current Discharge Medication List          Follow-up:  No follow-up provider specified.      Final Impression:   1. Lower abdominal pain    2. Perforated appendicitis    3. Abscess    4. Hyponatremia          Jagruti Nolasco, APRN-CNP    This patient was staffed with ED Attending Dr. Barreto to review the plan of care during ED course.    (Please note that portions of this note were completed with a voice recognition program.  Efforts were made to edit the dictations but occasionally words are mis-transcribed.)         [1]   Past Medical History:  Diagnosis Date    Type 2 diabetes mellitus with ophthalmic complication (Multi) 08/05/2025   [2]   Past Surgical History:  Procedure Laterality Date    OTHER SURGICAL HISTORY  11/30/2018    Coronary artery bypass graft    OTHER SURGICAL HISTORY  11/30/2018    Cardiac catheterization   [3] No family history on file.  [4]   Social History  Socioeconomic History    Marital status:    Tobacco Use    Smoking status: Never    Smokeless tobacco: Never   Substance and Sexual Activity    Alcohol use: Never     Social Drivers of Health     Financial Resource Strain: Low Risk  (8/5/2025)    Overall Financial Resource Strain (CARDIA)     Difficulty of Paying Living Expenses: Not hard at all   Food Insecurity: No Food Insecurity (8/5/2025)    Hunger Vital Sign     Worried About Running Out of Food in the Last Year: Never true     Ran Out of Food in the Last Year: Never true   Transportation Needs: No Transportation Needs (8/5/2025)    PRAPARE - Transportation     Lack of Transportation (Medical): No     Lack of Transportation (Non-Medical): No   Physical Activity:  Insufficiently Active (7/23/2025)    Received from [facility]    Exercise Vital Sign     On average, how many days per week do you engage in moderate to strenuous exercise (like a brisk walk)?: 2 days     On average, how many minutes do you engage in exercise at this level?: 60 min   Intimate Partner Violence: Not At Risk (8/5/2025)    Humiliation, Afraid, Rape, and Kick questionnaire     Fear of Current or Ex-Partner: No     Emotionally Abused: No     Physically Abused: No     Sexually Abused: No   Housing Stability: Low Risk  (8/5/2025)    Housing Stability Vital Sign     Unable to Pay for Housing in the Last Year: No     Number of Times Moved in the Last Year: 0     Homeless in the Last Year: No        ERIK Baldwin-CNP  08/05/25 9639

## 2025-08-05 NOTE — ED TRIAGE NOTES
Pt presents ambulatory via POV through triage from home for c/o RLQ and LLQ ABD pain with N/V that started on Saturday. Pt had an outpatient CT scan that showed appendicitis with possible perforation. Call light within reach.

## 2025-08-05 NOTE — H&P
History Of Present Illness  Nika Alejandre is a 89 y.o. female presenting with lower abdominal pain, that started late Friday night into Saturday. Pain has been in both lower quadrants and moves back and forth. Tylenol would briefly help. Urinating ok. She has felt chilled. The pain started rather suddenly, and has been constant and severe since it started. She also had some loose greenish black stool. No sob, chest pain. She has been very nauseated, unable to eat, drink much or take her pills. She has never had this before. She has been very weak, using her walker     Past Medical History  Hypertension  CAD  DM, insulin requiring  Hyperlipidemia  Hypothyroid  Macular degeneration  GERD  Nephrolithiasis    Surgical History  Bilateral TKR  Bilateral cataract removal  Lap cholecystectomy   D&C  CABG, 4 vessel     Social History  She reports that she has never smoked. She has never used smokeless tobacco. No history on file for alcohol use and drug use. Has a walker she uses off and on. Having some trouble seeing the numbers on her insulin pends    Family History  Hyperlipidemia  Head/neck cancer     Allergies  Statins-hmg-coa reductase inhibitors, Metformin, and Penicillins    Review of Systems   Constitutional:  Positive for activity change, appetite change, chills and fatigue.   HENT:  Positive for sneezing.    Eyes:  Positive for visual disturbance.   Respiratory: Negative.     Cardiovascular: Negative.    Gastrointestinal:  Positive for abdominal distention, abdominal pain, diarrhea, nausea and vomiting.   Endocrine: Negative.    Genitourinary:  Positive for frequency.   Musculoskeletal: Negative.    Skin: Negative.    Neurological:  Positive for weakness.   Hematological:  Positive for adenopathy.   Psychiatric/Behavioral: Negative.          Physical Exam  Constitutional:       Appearance: Normal appearance.   HENT:      Head: Normocephalic.      Nose: Nose normal.      Mouth/Throat:      Mouth: Mucous membranes  are dry.     Eyes:      Extraocular Movements: Extraocular movements intact.      Comments: Glasses in place   Neck:      Comments: No bruits, jvd   No obvious adenopathy  Cardiovascular:      Pulses: Normal pulses.      Comments: Regular, slightly increased S2  Pulmonary:      Effort: Pulmonary effort is normal.      Breath sounds: Normal breath sounds.   Abdominal:      Comments: Softly distended  Bowel sounds present  She is very tender in both lower quadrants. No rebound at this time     Musculoskeletal:      Comments: No edema. Good distal pulses     Skin:     General: Skin is warm and dry.     Neurological:      General: No focal deficit present.      Mental Status: She is alert and oriented to person, place, and time.          Results for orders placed or performed during the hospital encounter of 08/05/25 (from the past 24 hours)   CBC and Auto Differential   Result Value Ref Range    WBC 16.5 (H) 4.4 - 11.3 x10*3/uL    nRBC 0.0 0.0 - 0.0 /100 WBCs    RBC 4.62 4.00 - 5.20 x10*6/uL    Hemoglobin 13.5 12.0 - 16.0 g/dL    Hematocrit 40.0 36.0 - 46.0 %    MCV 87 80 - 100 fL    MCH 29.2 26.0 - 34.0 pg    MCHC 33.8 32.0 - 36.0 g/dL    RDW 13.1 11.5 - 14.5 %    Platelets 297 150 - 450 x10*3/uL    Neutrophils % 84.3 40.0 - 80.0 %    Immature Granulocytes %, Automated 1.3 (H) 0.0 - 0.9 %    Lymphocytes % 8.2 13.0 - 44.0 %    Monocytes % 5.8 2.0 - 10.0 %    Eosinophils % 0.2 0.0 - 6.0 %    Basophils % 0.2 0.0 - 2.0 %    Neutrophils Absolute 13.94 (H) 1.60 - 5.50 x10*3/uL    Immature Granulocytes Absolute, Automated 0.21 0.00 - 0.50 x10*3/uL    Lymphocytes Absolute 1.35 0.80 - 3.00 x10*3/uL    Monocytes Absolute 0.96 (H) 0.05 - 0.80 x10*3/uL    Eosinophils Absolute 0.04 0.00 - 0.40 x10*3/uL    Basophils Absolute 0.03 0.00 - 0.10 x10*3/uL   Comprehensive metabolic panel   Result Value Ref Range    Glucose 379 (H) 74 - 99 mg/dL    Sodium 126 (L) 136 - 145 mmol/L    Potassium 4.2 3.5 - 5.3 mmol/L    Chloride 91 (L) 98 -  "107 mmol/L    Bicarbonate 24 21 - 32 mmol/L    Anion Gap 15 10 - 20 mmol/L    Urea Nitrogen 24 (H) 6 - 23 mg/dL    Creatinine 0.97 0.50 - 1.05 mg/dL    eGFR 56 (L) >60 mL/min/1.73m*2    Calcium 9.2 8.6 - 10.3 mg/dL    Albumin 3.5 3.4 - 5.0 g/dL    Alkaline Phosphatase 63 33 - 136 U/L    Total Protein 6.9 6.4 - 8.2 g/dL    AST 10 9 - 39 U/L    Bilirubin, Total 0.7 0.0 - 1.2 mg/dL    ALT 8 7 - 45 U/L   Lactate   Result Value Ref Range    Lactate 2.3 (H) 0.4 - 2.0 mmol/L   Protime-INR   Result Value Ref Range    Protime 11.7 9.8 - 12.4 seconds    INR 1.1 0.9 - 1.1   Type And Screen   Result Value Ref Range    ABO TYPE O     Rh TYPE POS     ANTIBODY SCREEN NEG    Abo/Rh Group Test - STAT (VERAB)   Result Value Ref Range    ABO TYPE O     Rh TYPE POS        Last Recorded Vitals  Blood pressure 123/67, pulse 98, temperature 36.2 °C (97.2 °F), resp. rate 18, height 1.575 m (5' 2\"), weight 68 kg (150 lb), SpO2 95%.    Relevant Results  Assessment & Plan  Appendicitis with perforation    Acute appendicitis, with probable perforation. Elevated lactate, leukocytosis.monitor temp, white count. Start cefepime. Control pain, nausea. Antiemetics, ivf, clear liquids if tolerated. Appreciate surgery input. Lab in am. She is dehydrated. Fluids   DM. Most recent glyco was around 6.5. ISS ordered. Will order dose of lantus as well, but less than her usual 34 units.   Hypertension. Continue meds and monitor  CAD. Stable. No active symptoms. Will hold plavix in lieu of possible surgery  Macular degeneration. On schedule for corneal transplant  Hyperlipidemia  Hypothyroid. Resume her usual synthroid.   Nephrolithiasis. Ua ordered  DVT prophylaxis    I spent 42 minutes in the professional and overall care of this patient.      Denia Blankenship MD    "

## 2025-08-05 NOTE — PROGRESS NOTES
Pharmacy Medication History Review    Nika Alejandre is a 89 y.o. female admitted for No Principal Problem: There is no principal problem currently on the Problem List. Please update the Problem List and refresh.. Pharmacy reviewed the patient's [id number] medications and allergies for accuracy.    The list below reflectives the updated PTA list. Please review each medication in order reconciliation for additional clarification and justification.  Prior to Admission Medications   Prescriptions Last Dose Informant Patient Reported? Taking?   calcium carb,gluc/mag gluc,ox (CALCIUM MAGNESIUM ORAL) 8/2/2025  Yes No   Sig: Take 1 tablet by mouth once daily.   cholecalciferol (Vitamin D3) 10 mcg (400 units) tablet 8/2/2025  Yes No   Sig: Take 1 tablet (10 mcg) by mouth once daily.   clopidogrel (Plavix) 75 mg tablet 8/2/2025  Yes No   Sig: Take 1 tablet (75 mg) by mouth once daily.   insulin aspart (NovoLOG Flexpen U-100 Insulin) 100 unit/mL (3 mL) pen 8/2/2025  Yes No   Sig: Inject under the skin 3 times a day before meals. Take as directed per insulin instructions.   insulin glargine (Lantus Solostar U-100 Insulin) 100 unit/mL (3 mL) pen 8/2/2025  Yes No   Sig: Inject under the skin once daily at bedtime. Take as directed per insulin instructions.   levothyroxine (Synthroid, Levoxyl) 75 mcg tablet 8/2/2025  Yes No   Sig: Take 1 tablet (75 mcg) by mouth early in the morning.. Take on an empty stomach at the same time each day, either 30 to 60 minutes prior to breakfast   rosuvastatin (Crestor) 5 mg tablet 8/2/2025  Yes No   Sig: Take 1 tablet (5 mg) by mouth once daily.   valsartan (Diovan) 160 mg tablet 8/2/2025  Yes No   Sig: Take 1 tablet (160 mg) by mouth once daily.      Facility-Administered Medications: None            The list below reflectives the updated allergy list. Please review each documented allergy for additional clarification and justification.  Allergies  Reviewed by Evangelista Hawkins on 8/5/2025         Severity Reactions Comments    Statins-hmg-coa Reductase Inhibitors Medium Other, Rash     Metformin Not Specified Unknown     Penicillins Low Rash             Below are additional concerns with the patient's PTA list.      SUHA BOYER

## 2025-08-05 NOTE — PROGRESS NOTES
08/05/25 1053   Discharge Planning   Living Arrangements Alone  (2 sons live on either side of patient)   Support Systems Children   Assistance Needed A&OX4; independent with ADLs with walker recently; doesn't drive; room air baseline and currently room air-denies CPAP; PCP Dr Peter Holt-denies current home care   Type of Residence Private residence   Number of Stairs to Enter Residence 3   Number of Stairs Within Residence 0   Do you have animals or pets at home? No   Who is requesting discharge planning? Provider   Expected Discharge Disposition Home  (DC dispo pending workup / GI Surgery)   Does the patient need discharge transport arranged? Yes   [facility] coordination needed? Yes   Has discharge transport been arranged? No   Financial Resource Strain   How hard is it for you to pay for the very basics like food, housing, medical care, and heating? Not hard   Housing Stability   In the last 12 months, was there a time when you were not able to pay the mortgage or rent on time? N   In the past 12 months, how many times have you moved where you were living? 0   At any time in the past 12 months, were you homeless or living in a shelter (including now)? N   Transportation Needs   In the past 12 months, has lack of transportation kept you from medical appointments or from getting medications? no   In the past 12 months, has lack of transportation kept you from meetings, work, or from getting things needed for daily living? No   Intensity of Service   Intensity of Service 0-30 min     08/05/2025 1055am  Spoke with patient and patient's daughter bedside in ED.

## 2025-08-05 NOTE — CONSULTS
General/Trauma Surgery History and Physical      History Of Present Illness  Nika Alejandre is a 89 y.o. female who presented to her PCP yesterday with complaints of abdominal pain. Starting Friday, she developed new onset abdominal pain located on the right and left lower quadrants. Over the weekend, pain was constant and persistent. She was unable to eat or take PO meds except tylenol due to nausea and was only able to stay hydrated with small sips. She also noted vomiting with diarrhea over the weekend that were green tinged stools. Her PCP therefore ordered a CT that was done out of network at [facility] and was directed to the ED after abnormality was noted consistent with appendicitis and possible contained perforation. Imaging has been uploaded in PACS. She is afebrile however does have a leukocytosis of 16 on presentation, glucose 379, sodium 126, lactate 2.3.        Past Medical History  Diabetes mellitus  Peripheral neuopathy  HLD  HTN  GERD  Macular degeneration   Hypothryoid  Renal calculi    Surgical History  CABG  Cataract surgery  D&C  TKA  Lap cholecystectomy      Social History  Former smoker. Denies alcohol or illicit. Lives at home.    Family History  Father - head/neck/throat cancer  Sister - HLD     Allergies  Statins-hmg-coa reductase inhibitors, Metformin, and Penicillins    Meds  Current Outpatient Medications   Medication Instructions    calcium carb,gluc/mag gluc,ox (CALCIUM MAGNESIUM ORAL) 1 tablet, oral, Daily    cholecalciferol (VITAMIN D3) 10 mcg, oral, Daily    clopidogrel (PLAVIX) 75 mg, oral, Daily    insulin aspart (NovoLOG Flexpen U-100 Insulin) 100 unit/mL (3 mL) pen subcutaneous, 3 times daily before meals, Take as directed per insulin instructions.    insulin glargine (Lantus Solostar U-100 Insulin) 100 unit/mL (3 mL) pen subcutaneous, Nightly, Take as directed per insulin instructions.    levothyroxine (SYNTHROID, LEVOXYL) 75 mcg, oral, Daily, Take on an empty stomach at the  "same time each day, either 30 to 60 minutes prior to breakfast    rosuvastatin (CRESTOR) 5 mg, oral, Daily    valsartan (DIOVAN) 160 mg, oral, Daily        Review of Systems   A complete 10 point review of systems was performed and is negative except as noted in the history of present illness.     Last Recorded Vitals  Blood pressure 159/66, pulse 98, temperature 36.2 °C (97.2 °F), resp. rate 18, height 1.575 m (5' 2\"), weight 68 kg (150 lb), SpO2 99%.    0-10 (Numeric) Pain Score: 4     Physical Exam  Constitutional: No acute distress. Sitting up in bed.  Neuro: Alert, oriented. Follows commands.   Eyes: Extraocular motions intact. No scleral icterus. Conjunctiva pink.   EENT: Mucous membranes moist. Normal dentition. Hears normal speaking voice.  Neck: Supple. No masses.  Cardiovascular: Regular rate. Palpable pulses bilaterally. No pitting edema.   Respiratory: No increased work of breathing or audible wheeze. Equal expansion.  Abdomen: Soft abdomen. Mildly distended. Nontender throughout. Tenderness to LLQ and RLQ, no guarding or rigidity.  MSK: Moves all extremities. No atrophy.  Lymphatic: No palpable lymph nodes. No lymphedema.   Skin: Warm, dry, intact. No rashes or lesions.   Psychological: Appropriate mood and behavior.         Relevant Results  Laboratory Results:  CBC:   Lab Results   Component Value Date    WBC 16.5 (H) 08/05/2025    RBC 4.62 08/05/2025    HGB 13.5 08/05/2025    HCT 40.0 08/05/2025     08/05/2025       RFP:   Lab Results   Component Value Date     (L) 08/05/2025    K 4.2 08/05/2025    CL 91 (L) 08/05/2025    CO2 24 08/05/2025    BUN 24 (H) 08/05/2025    CREATININE 0.97 08/05/2025    CALCIUM 9.2 08/05/2025        LFTs:   Lab Results   Component Value Date    PROT 6.9 08/05/2025    ALBUMIN 3.5 08/05/2025    BILITOT 0.7 08/05/2025    ALKPHOS 63 08/05/2025    AST 10 08/05/2025    ALT 8 08/05/2025               Imaging:  XR chest 1 view  Narrative: Interpreted By:  Jose, " Josefina,   STUDY:  XR CHEST 1 VIEW;  8/5/2025 10:53 am      INDICATION:  Signs/Symptoms:preop.          COMPARISON:  Chest radiograph 11/30/2018      ACCESSION NUMBER(S):  [id number]      ORDERING CLINICIAN:  RUTHY VARELA      FINDINGS:  AP radiograph of the chest was provided.      Patient is status post median sternotomy.      CARDIOMEDIASTINAL SILHOUETTE:  Cardiomediastinal silhouette is normal in size and configuration.      LUNGS:  There is no consolidation, pleural effusion, or pneumothorax.      ABDOMEN:  No remarkable upper abdominal findings.      BONES:  No acute osseous changes.      Impression: No evidence of acute cardiopulmonary process.      MACRO:  None      Signed by: Josefina Garcia 8/5/2025 11:19 AM  Dictation workstation:   [id number]         Assessment/Plan   This is a 89 y.o. female who presents with complaints of abdominal pain starting Friday. Directed to ED after PCP obtained CT that reported acute appendicitis with possible contained perforation. CT was done out of network and imaging uploaded to Epic/PACS. Discussed case with radiology who reports small, ill defined abscess and no indication for drain at this time. Will plan IV antibiotics at this time and possible interval appendectomy in 6 weeks given duration of symptoms and perforation.      Plan:  -- IV Cipro/Flagyl  -- Clear liquids, slow advancement  -- IV fluid hydration, multimodal pain regimen, antiemetics, bowel regimen  -- Will plan for conservative treatment with antibiotics and possible interval appendectomy in 6 weeks            Seen and discussed with Dr. Tobias who is in agreement with plan. Please secure chat with questions.    Marce Coyne PA-C

## 2025-08-06 ENCOUNTER — APPOINTMENT (OUTPATIENT)
Dept: RADIOLOGY | Facility: EXTERNAL LOCATION | Age: OVER 89
End: 2025-08-06
Payer: MEDICARE

## 2025-08-06 LAB
ANION GAP SERPL CALC-SCNC: 12 MMOL/L (ref 10–20)
BASOPHILS # BLD AUTO: 0.03 X10*3/UL (ref 0–0.1)
BASOPHILS NFR BLD AUTO: 0.2 %
BUN SERPL-MCNC: 24 MG/DL (ref 6–23)
CALCIUM SERPL-MCNC: 8.2 MG/DL (ref 8.6–10.3)
CHLORIDE SERPL-SCNC: 100 MMOL/L (ref 98–107)
CO2 SERPL-SCNC: 23 MMOL/L (ref 21–32)
CREAT SERPL-MCNC: 0.98 MG/DL (ref 0.5–1.05)
CRP SERPL-MCNC: 15.7 MG/DL
EGFRCR SERPLBLD CKD-EPI 2021: 55 ML/MIN/1.73M*2
EOSINOPHIL # BLD AUTO: 0.09 X10*3/UL (ref 0–0.4)
EOSINOPHIL NFR BLD AUTO: 0.7 %
ERYTHROCYTE [DISTWIDTH] IN BLOOD BY AUTOMATED COUNT: 13.2 % (ref 11.5–14.5)
GLUCOSE BLD MANUAL STRIP-MCNC: 208 MG/DL (ref 74–99)
GLUCOSE BLD MANUAL STRIP-MCNC: 224 MG/DL (ref 74–99)
GLUCOSE BLD MANUAL STRIP-MCNC: 226 MG/DL (ref 74–99)
GLUCOSE BLD MANUAL STRIP-MCNC: 249 MG/DL (ref 74–99)
GLUCOSE SERPL-MCNC: 206 MG/DL (ref 74–99)
HCT VFR BLD AUTO: 30.5 % (ref 36–46)
HGB BLD-MCNC: 10.1 G/DL (ref 12–16)
IMM GRANULOCYTES # BLD AUTO: 0.13 X10*3/UL (ref 0–0.5)
IMM GRANULOCYTES NFR BLD AUTO: 1 % (ref 0–0.9)
LYMPHOCYTES # BLD AUTO: 1.42 X10*3/UL (ref 0.8–3)
LYMPHOCYTES NFR BLD AUTO: 11.4 %
MCH RBC QN AUTO: 29.1 PG (ref 26–34)
MCHC RBC AUTO-ENTMCNC: 33.1 G/DL (ref 32–36)
MCV RBC AUTO: 88 FL (ref 80–100)
MONOCYTES # BLD AUTO: 1.12 X10*3/UL (ref 0.05–0.8)
MONOCYTES NFR BLD AUTO: 9 %
NEUTROPHILS # BLD AUTO: 9.66 X10*3/UL (ref 1.6–5.5)
NEUTROPHILS NFR BLD AUTO: 77.7 %
NRBC BLD-RTO: 0 /100 WBCS (ref 0–0)
PLATELET # BLD AUTO: 226 X10*3/UL (ref 150–450)
POTASSIUM SERPL-SCNC: 3.5 MMOL/L (ref 3.5–5.3)
RBC # BLD AUTO: 3.47 X10*6/UL (ref 4–5.2)
SODIUM SERPL-SCNC: 131 MMOL/L (ref 136–145)
WBC # BLD AUTO: 12.5 X10*3/UL (ref 4.4–11.3)

## 2025-08-06 PROCEDURE — 2500000001 HC RX 250 WO HCPCS SELF ADMINISTERED DRUGS (ALT 637 FOR MEDICARE OP): Performed by: PHYSICIAN ASSISTANT

## 2025-08-06 PROCEDURE — 2500000004 HC RX 250 GENERAL PHARMACY W/ HCPCS (ALT 636 FOR OP/ED): Performed by: INTERNAL MEDICINE

## 2025-08-06 PROCEDURE — 36415 COLL VENOUS BLD VENIPUNCTURE: CPT | Performed by: INTERNAL MEDICINE

## 2025-08-06 PROCEDURE — 99233 SBSQ HOSP IP/OBS HIGH 50: CPT | Performed by: INTERNAL MEDICINE

## 2025-08-06 PROCEDURE — 85025 COMPLETE CBC W/AUTO DIFF WBC: CPT | Performed by: INTERNAL MEDICINE

## 2025-08-06 PROCEDURE — 86140 C-REACTIVE PROTEIN: CPT | Performed by: INTERNAL MEDICINE

## 2025-08-06 PROCEDURE — 80048 BASIC METABOLIC PNL TOTAL CA: CPT | Performed by: INTERNAL MEDICINE

## 2025-08-06 PROCEDURE — 2500000001 HC RX 250 WO HCPCS SELF ADMINISTERED DRUGS (ALT 637 FOR MEDICARE OP): Performed by: INTERNAL MEDICINE

## 2025-08-06 PROCEDURE — 2500000002 HC RX 250 W HCPCS SELF ADMINISTERED DRUGS (ALT 637 FOR MEDICARE OP, ALT 636 FOR OP/ED): Performed by: INTERNAL MEDICINE

## 2025-08-06 PROCEDURE — 1100000001 HC PRIVATE ROOM DAILY

## 2025-08-06 PROCEDURE — 82947 ASSAY GLUCOSE BLOOD QUANT: CPT

## 2025-08-06 RX ORDER — ACETAMINOPHEN 325 MG/1
650 TABLET ORAL EVERY 6 HOURS SCHEDULED
Status: DISCONTINUED | OUTPATIENT
Start: 2025-08-06 | End: 2025-08-10

## 2025-08-06 RX ORDER — SODIUM CHLORIDE AND POTASSIUM CHLORIDE 150; 900 MG/100ML; MG/100ML
85 INJECTION, SOLUTION INTRAVENOUS CONTINUOUS
Status: DISCONTINUED | OUTPATIENT
Start: 2025-08-06 | End: 2025-08-08

## 2025-08-06 RX ORDER — METRONIDAZOLE 500 MG/100ML
500 INJECTION, SOLUTION INTRAVENOUS EVERY 8 HOURS SCHEDULED
Status: DISPENSED | OUTPATIENT
Start: 2025-08-06

## 2025-08-06 RX ORDER — INSULIN GLARGINE-YFGN 100 [IU]/ML
25 INJECTION, SOLUTION SUBCUTANEOUS NIGHTLY
Status: DISCONTINUED | OUTPATIENT
Start: 2025-08-06 | End: 2025-08-10

## 2025-08-06 RX ADMIN — LEVOTHYROXINE SODIUM 75 MCG: 0.07 TABLET ORAL at 06:22

## 2025-08-06 RX ADMIN — INSULIN LISPRO 4 UNITS: 100 INJECTION, SOLUTION INTRAVENOUS; SUBCUTANEOUS at 17:24

## 2025-08-06 RX ADMIN — SODIUM CHLORIDE AND POTASSIUM CHLORIDE 85 ML/HR: 9; 1.49 INJECTION, SOLUTION INTRAVENOUS at 12:14

## 2025-08-06 RX ADMIN — INSULIN LISPRO 4 UNITS: 100 INJECTION, SOLUTION INTRAVENOUS; SUBCUTANEOUS at 08:17

## 2025-08-06 RX ADMIN — METRONIDAZOLE 500 MG: 500 SOLUTION INTRAVENOUS at 09:52

## 2025-08-06 RX ADMIN — CHOLECALCIFEROL (VITAMIN D3) 10 MCG (400 UNIT) TABLET 10 MCG: at 08:18

## 2025-08-06 RX ADMIN — SODIUM CHLORIDE 90 ML/HR: 0.9 INJECTION, SOLUTION INTRAVENOUS at 06:22

## 2025-08-06 RX ADMIN — CEFEPIME HYDROCHLORIDE 1 G: 1 INJECTION, SOLUTION INTRAVENOUS at 21:30

## 2025-08-06 RX ADMIN — METRONIDAZOLE 500 MG: 500 SOLUTION INTRAVENOUS at 22:21

## 2025-08-06 RX ADMIN — INSULIN GLARGINE-YFGN 25 UNITS: 100 INJECTION, SOLUTION SUBCUTANEOUS at 21:00

## 2025-08-06 RX ADMIN — VALSARTAN 160 MG: 160 TABLET, FILM COATED ORAL at 08:17

## 2025-08-06 RX ADMIN — SODIUM CHLORIDE AND POTASSIUM CHLORIDE 85 ML/HR: 9; 1.49 INJECTION, SOLUTION INTRAVENOUS at 12:11

## 2025-08-06 RX ADMIN — ACETAMINOPHEN 650 MG: 325 TABLET ORAL at 08:24

## 2025-08-06 RX ADMIN — CEFEPIME HYDROCHLORIDE 1 G: 1 INJECTION, SOLUTION INTRAVENOUS at 06:22

## 2025-08-06 RX ADMIN — ENOXAPARIN SODIUM 40 MG: 100 INJECTION SUBCUTANEOUS at 08:17

## 2025-08-06 RX ADMIN — ACETAMINOPHEN 650 MG: 325 TABLET ORAL at 12:11

## 2025-08-06 RX ADMIN — ACETAMINOPHEN 650 MG: 325 TABLET ORAL at 17:24

## 2025-08-06 RX ADMIN — INSULIN LISPRO 4 UNITS: 100 INJECTION, SOLUTION INTRAVENOUS; SUBCUTANEOUS at 12:12

## 2025-08-06 RX ADMIN — CEFEPIME HYDROCHLORIDE 1 G: 1 INJECTION, SOLUTION INTRAVENOUS at 15:02

## 2025-08-06 ASSESSMENT — COGNITIVE AND FUNCTIONAL STATUS - GENERAL
DAILY ACTIVITIY SCORE: 24
MOBILITY SCORE: 24
DAILY ACTIVITIY SCORE: 24
MOBILITY SCORE: 24

## 2025-08-06 ASSESSMENT — PAIN SCALES - GENERAL
PAINLEVEL_OUTOF10: 0 - NO PAIN
PAINLEVEL_OUTOF10: 0 - NO PAIN

## 2025-08-06 ASSESSMENT — PAIN - FUNCTIONAL ASSESSMENT
PAIN_FUNCTIONAL_ASSESSMENT: 0-10
PAIN_FUNCTIONAL_ASSESSMENT: 0-10

## 2025-08-06 ASSESSMENT — ACTIVITIES OF DAILY LIVING (ADL): LACK_OF_TRANSPORTATION: NO

## 2025-08-06 NOTE — PROGRESS NOTES
Patient: Nika Alejandre  Room/bed: 139/139-A  Admitted on: 8/5/2025    Age: 89 y.o.   Gender: female  Code Status:  No Order   Admitting Dx: Abscess [L02.91]  Hyponatremia [E87.1]  Lower abdominal pain [R10.30]  Perforated appendicitis [K35.32]    MRN: [id number]  PCP: Karson Holt MD       Subjective   . Nausea has been better. Pain not quite as severe. One loose stool, normal color    Objective    Physical Exam  HENT:      Head: Normocephalic.      Nose: Nose normal.      Mouth/Throat:      Comments: Less dry than yesterday    Eyes:      Extraocular Movements: Extraocular movements intact.      Pupils: Pupils are equal, round, and reactive to light.       Cardiovascular:      Comments: Regular, 2/6 systolic murmur. Valve click  Pulmonary:      Effort: Pulmonary effort is normal.      Breath sounds: Normal breath sounds.   Abdominal:      Comments: She is more distended today  Bowel sounds less active but present  Increased tenderness to palpation, now tender across upper abdomen, as well as lower. Some guarding.      Musculoskeletal:      Comments: No edema. Pulses equal     Skin:     General: Skin is warm and dry.     Neurological:      Mental Status: She is alert and oriented to person, place, and time. Mental status is at baseline.     Psychiatric:         Mood and Affect: Mood normal.         Behavior: Behavior normal.          Temp:  [35.9 °C (96.6 °F)-36.7 °C (98.1 °F)] 35.9 °C (96.6 °F)  Heart Rate:  [74-98] 76  Resp:  [18] 18  BP: (115-167)/() 115/52    Vitals:    08/05/25 1022   Weight: 68 kg (150 lb)           I/Os    Intake/Output Summary (Last 24 hours) at 8/6/2025 1017  Last data filed at 8/6/2025 0920  Gross per 24 hour   Intake 1144.5 ml   Output --   Net 1144.5 ml       Labs:   Results from last 72 hours   Lab Units 08/06/25  0540 08/05/25  1041   SODIUM mmol/L 131* 126*   POTASSIUM mmol/L 3.5 4.2   CHLORIDE mmol/L 100 91*   CO2 mmol/L 23 24   BUN mg/dL 24* 24*   CREATININE mg/dL 0.98 0.97  "  GLUCOSE mg/dL 206* 379*   CALCIUM mg/dL 8.2* 9.2   ANION GAP mmol/L 12 15   EGFR mL/min/1.73m*2 55* 56*      Results from last 72 hours   Lab Units 08/06/25  0540 08/05/25  1041   WBC AUTO x10*3/uL 12.5* 16.5*   HEMOGLOBIN g/dL 10.1* 13.5   HEMATOCRIT % 30.5* 40.0   PLATELETS AUTO x10*3/uL 226 297   NEUTROS PCT AUTO % 77.7 84.3   LYMPHS PCT AUTO % 11.4 8.2   MONOS PCT AUTO % 9.0 5.8   EOS PCT AUTO % 0.7 0.2      Lab Results   Component Value Date    CALCIUM 8.2 (L) 08/06/2025    PHOS 2.4 (L) 11/09/2018      Lab Results   Component Value Date    CRP 15.70 (H) 08/06/2025      [unfilled]     Micro/ID:   No results found for the last 90 days.                   No lab exists for component: \"AGALPCRNB\"   .ID  No results found for: \"URINECULTURE\", \"BLOODCULT\", \"CSFCULTSMEAR\"    Images:  CT abdomen pelvis w IV contrast  Narrative: Interpreted By:  Connie Kenney,   STUDY:  CT ABDOMEN PELVIS W IV CONTRAST;  8/5/2025 1:30 pm      INDICATION:  Signs/Symptoms:RQ pain.      COMPARISON:  11/02/2023      ACCESSION NUMBER(S):  [id number]      ORDERING CLINICIAN:  RUTHY VARELA      TECHNIQUE:  CT of the abdomen and pelvis was performed following the intravenous  administration 75 mL of Omnipaque 350. Sagittal and coronal  reconstructions were generated.      FINDINGS:  LOWER CHEST:  There is a small hiatal hernia. There are coronary artery  calcifications.      ABDOMEN:      LIVER:  Unremarkable      BILE DUCTS:  Unremarkable      GALLBLADDER:  Patient is status post cholecystectomy.      PANCREAS:  Unremarkable      SPLEEN:  Unremarkable      ADRENAL GLANDS:  There are no adrenal masses.      KIDNEYS AND URETERS:  Kidneys are not obstructed. There are no calculi.      Ureters are normal in caliber.      PELVIS:      BLADDER:  Unremarkable      REPRODUCTIVE ORGANS:  The uterus is identified. Is slightly enlarged measuring 9.8  centimeters in length.      BOWEL:  There are dilated loops of small bowel. These measure up to " 34 mm. A  transition zone is not obvious. The distal ileum appears to be thick  walled.      There is suggestion of a dilated fluid filled appendix.      There are colonic diverticula the colon is not distended.      VESSELS:  There are atherosclerotic changes of the aorta. The cava is  unremarkable.      PERITONEUM/RETROPERITONEUM/LYMPH NODES:  There is free peritoneal fluid around the liver and in the pelvis.  There is a question of abscess between uterus and bladder. On the  axial image, it measures approximately 51 x 24 mm.      No significant lymphadenopathy.      BONES AND ABDOMINAL WALL:  There are degenerative changes of spine.      COMPARISON OF FINDINGS:  The appendix was normal appearance on the prior exam.      Impression: Findings suggestive of perforated appendicitis with free fluid. There  is also question of an abscess between uterus and urinary bladder.      There are distended loops of small bowel likely due to an ileus.      MACRO:  Dr. Connie Kenney discussed the significance and urgency of this  critical finding by telephone with NP RUTHY VARELA on 8/5/2025  at 2:36 pm.  (**-RCF-**) Findings:  See findings.          Signed by: Connie Kenney 8/5/2025 2:38 PM  Dictation workstation:   [id number]  XR chest 1 view  Narrative: Interpreted By:  Josefina Garcia,   STUDY:  XR CHEST 1 VIEW;  8/5/2025 10:53 am      INDICATION:  Signs/Symptoms:preop.          COMPARISON:  Chest radiograph 11/30/2018      ACCESSION NUMBER(S):  [id number]      ORDERING CLINICIAN:  RUTHY VARELA      FINDINGS:  AP radiograph of the chest was provided.      Patient is status post median sternotomy.      CARDIOMEDIASTINAL SILHOUETTE:  Cardiomediastinal silhouette is normal in size and configuration.      LUNGS:  There is no consolidation, pleural effusion, or pneumothorax.      ABDOMEN:  No remarkable upper abdominal findings.      BONES:  No acute osseous changes.      Impression: No evidence of acute cardiopulmonary  process.      MACRO:  None      Signed by: Josefina Garcia 8/5/2025 11:19 AM  Dictation workstation:   [id number]       Meds    Scheduled medications  Scheduled Medications[1]  Continuous medications  Continuous Medications[2]  PRN medications  PRN Medications[3]     Assessment and Plan    Nika Alejandre is a 89 y.o. female   Acute appendicitis, with perforation. Conservative management for now, however, more distended today and more diffuse discomfort. Empiric ATB. Surgery monitoring. Trend crp, leukocytosis, temp  CAD, asymptomatic PTA. Holding plavix until we know for sure she will not require surgery  Hypertension. Pressures on softer side. Monitor closely  Hypothyroid, continue supplement  DM. Monitor, with iss as needed. Have lowered lantus dose for now.   Dehydration, clinical. Improving. Mild hyponatremia persists. Will continue, oral intake low.  DVT prophylaxis    Denia Blankenship MD         [1] acetaminophen, 650 mg, oral, q6h JAILENE  cefepime, 1 g, intravenous, q8h JAILENE  cholecalciferol, 10 mcg, oral, Daily  enoxaparin, 40 mg, subcutaneous, Daily  insulin glargine, 20 Units, subcutaneous, Nightly  insulin lispro, 0-10 Units, subcutaneous, TID AC  levothyroxine, 75 mcg, oral, Daily  metroNIDAZOLE, 500 mg, intravenous, q8h JAILENE  valsartan, 160 mg, oral, Daily  [2] sodium chloride 0.9%, 90 mL/hr, Last Rate: 90 mL/hr (08/06/25 0622)  [3] PRN medications: morphine, ondansetron

## 2025-08-06 NOTE — PROGRESS NOTES
"General/Trauma Surgery Daily Progress Note    Subjective   Reports improvement in pain, however still remains tender and distended. Denies nausea. Tolerating clear liquids. No bowel movement. Afebrile. Nontachycardic.        Objective   Last Recorded Vitals:  Blood pressure 115/52, pulse 82, temperature 35.9 °C (96.6 °F), temperature source Temporal, resp. rate 18, height 1.575 m (5' 2\"), weight 68 kg (150 lb), SpO2 97%.    Intake/Output last 3 Shifts:  I/O last 3 completed shifts:  In: 1024.5 (15.1 mL/kg) [P.O.:240; I.V.:634.5 (9.3 mL/kg); IV Piggyback:150]  Out: - (0 mL/kg)   Weight: 68 kg     Pain Score:  0-10 (Numeric) Pain Score: 0 - No pain     Physical Exam:  Constitutional: No acute distress, sitting up in bed.  Neuro: Alert, oriented. Follows commands.   Eyes: EOMI. No scleral icterus. Conjunctiva pink.  ENT: MMM.   Heart: Regular rate.  Respiratory: No increased work of breathing or audible wheeze.  Abdomen: Soft, nondistended. LLQ and RLQ tenderness.   MSK: Moves all extremities.  Vascular: Palpable pulses throughout, no pitting edema.  Skin: No rashes.   Psychological: Appropriate mood and behavior.            Relevant Results  Laboratory Results:  CBC:   Lab Results   Component Value Date    WBC 12.5 (H) 08/06/2025    RBC 3.47 (L) 08/06/2025    HGB 10.1 (L) 08/06/2025    HCT 30.5 (L) 08/06/2025     08/06/2025       RFP:   Lab Results   Component Value Date     (L) 08/06/2025    K 3.5 08/06/2025     08/06/2025    CO2 23 08/06/2025    BUN 24 (H) 08/06/2025    CREATININE 0.98 08/06/2025    CALCIUM 8.2 (L) 08/06/2025        LFTs:   Lab Results   Component Value Date    PROT 6.9 08/05/2025    ALBUMIN 3.5 08/05/2025    BILITOT 0.7 08/05/2025    ALKPHOS 63 08/05/2025    AST 10 08/05/2025    ALT 8 08/05/2025                 Imaging  CT abdomen pelvis w IV contrast  Result Date: 8/5/2025  Findings suggestive of perforated appendicitis with free fluid. There is also question of an abscess " between uterus and urinary bladder.   There are distended loops of small bowel likely due to an ileus.   MACRO: Dr. Connie Kenney discussed the significance and urgency of this critical finding by telephone with NP RUTHY VARELA on 8/5/2025 at 2:36 pm.  (**-RCF-**) Findings:  See findings.     Signed by: Connie Kenney 8/5/2025 2:38 PM Dictation workstation:   [id number]    XR chest 1 view  Result Date: 8/5/2025  No evidence of acute cardiopulmonary process.   MACRO: None   Signed by: Josefina Garcia 8/5/2025 11:19 AM Dictation workstation:   [id number]      Cardiology, Vascular, and Other Imaging  No other imaging results found for the past 2 days           Assessment/Plan   This is a 89 y.o. female who presents with complaints of abdominal pain starting Friday. Directed to ED after PCP obtained CT that reported acute appendicitis with possible contained perforation. CT was done out of network and imaging uploaded to Epic/PACS. Discussed case with radiology who reports small, ill defined abscess and no indication for drain at this time. Will plan IV antibiotics at this time and possible interval appendectomy in 6 weeks given duration of symptoms and perforation.        Plan:  -- IV Cefepime   -- Clear liquids today until leukocytosis normalizes.  -- IV fluid hydration, multimodal pain regimen, antiemetics, bowel regimen  -- Will plan for conservative treatment with antibiotics and possible interval appendectomy in 6 weeks  -- Ambulate, out of bed   -- Agree with holding Plavix until abdomen improves                 Seen and discussed with Dr. Tobias who is in agreement with plan. Please secure chat with questions.     Marce Coyne PA-C

## 2025-08-06 NOTE — PROGRESS NOTES
08/06/25 0908   Discharge Planning   Living Arrangements Alone  (2 sons live on either side of patient)   Support Systems Children   Assistance Needed A&OX4; independent with ADLs with walker recently; doesn't drive; room air baseline and currently room air-denies CPAP; PCP Dr Peter Holt-denies current home care   Type of Residence Private residence   Number of Stairs to Enter Residence 3   Number of Stairs Within Residence 0   Do you have animals or pets at home? No   Who is requesting discharge planning? Provider   Expected Discharge Disposition Home  (DC dispo pending workup / GI Surgery)   Does the patient need discharge transport arranged? Yes   [facility] coordination needed? Yes   Has discharge transport been arranged? No   Financial Resource Strain   How hard is it for you to pay for the very basics like food, housing, medical care, and heating? Not hard   Housing Stability   In the last 12 months, was there a time when you were not able to pay the mortgage or rent on time? N   In the past 12 months, how many times have you moved where you were living? 0   At any time in the past 12 months, were you homeless or living in a shelter (including now)? N   Transportation Needs   In the past 12 months, has lack of transportation kept you from medical appointments or from getting medications? no   In the past 12 months, has lack of transportation kept you from meetings, work, or from getting things needed for daily living? No

## 2025-08-06 NOTE — CONSULTS
"Nutrition Initial Assessment:   Nutrition Assessment    Reason for Assessment: Admission nursing screening (MST=3; Unintentional weight loss, Poor PO intake)    Patient is a 89 y.o. female presenting with abdominal pain 2/2 acute appendicitis with probable perforation.     Surgery currently recommending conservative management with antibiotics & IVFs.     Medical History[1]    Nutrition History:  Food and Nutrient History: Visit made, pt resting in bed having CLD lunch at that time. Reports prior to this weekend she was eating consistent to her baseline with a good appetite. Notes she went to a potluck & fell ill the next day which she thought was 2/2 food poisoning. Usually has x3 meals/day without issue. Does not use any oral supplements at home but receptive to Ensure Clear until eating better. No additional needs currently.  Vitamin/Herbal Supplement Use: None  Food Allergy:  (None)     Anthropometrics:  Height: 157.5 cm (5' 2\")   Weight: 68 kg (150 lb)   BMI (Calculated): 27.43  IBW/kg (Dietitian Calculated): 50 kg  Percent of IBW: 136 %    Weight History:   Wt Readings from Last 50 Encounters:   08/05/25 68 kg (150 lb)   11/02/23 70.3 kg (155 lb)   05/14/21 67.2 kg (148 lb 2.4 oz)   08/14/20 63.9 kg (140 lb 14 oz)     Weight Change %:  Weight History / % Weight Change: Stable weight per historical EMR weights  Significant Weight Loss: No    Nutrition Focused Physical Exam Findings:  Subcutaneous Fat Loss:   Orbital Fat Pads: Well nourished (slightly bulging fat pads)  Buccal Fat Pads: Well nourished (full, rounded cheeks)  Muscle Wasting:  Temporalis: Well nourished (well-defined muscle)  Deltoid/Trapezius: Well nourished (rounded appearance at arm, shoulder, neck)  Edema:  Edema:  (None)  Physical Findings:  Skin:  (Intact)  Digestive System Findings: Abdominal pain, Diarrhea, Nausea  Mouth Findings:  (None)    Nutrition Significant Labs:  BMP Trend:   Results from last 7 days   Lab Units 08/06/25  0540 " 08/05/25  1041   GLUCOSE mg/dL 206* 379*   CALCIUM mg/dL 8.2* 9.2   SODIUM mmol/L 131* 126*   POTASSIUM mmol/L 3.5 4.2   CO2 mmol/L 23 24   CHLORIDE mmol/L 100 91*   BUN mg/dL 24* 24*   CREATININE mg/dL 0.98 0.97      Nutrition Specific Medications:  Scheduled medications  Scheduled Medications[2]  Continuous medications  Continuous Medications[3]  PRN medications  PRN Medications[4]    I/O:   Last BM Date: 08/06/25; Stool Appearance: Loose, Soft (08/06/25 1744)    Dietary Orders (From admission, onward)       Start     Ordered    08/06/25 1404  Oral nutritional supplements  Until discontinued        Comments: Berry flavor preference   Question Answer Comment   Deliver with Breakfast    Deliver with Dinner    Select supplement: Ensure Clear        08/06/25 1404    08/05/25 1619  May Participate in Room Service With Assistance  ( ROOM SERVICE MAY PARTICIPATE WITH ASSISTANCE)  Once        Question:  .  Answer:  Yes    08/05/25 1618    08/05/25 1558  Adult diet Clear Liquid  Diet effective now        Question:  Diet type  Answer:  Clear Liquid    08/05/25 1558             Estimated Needs:   Total Energy Estimated Needs in 24 hours (kCal):  (1500+)  Method for Estimating Needs: ~30 kcals/kg x IBW  Total Protein Estimated Needs in 24 Hours (g):  (50-60)  Method for Estimating 24 Hour Protein Needs: 1.0-1.2 g/kg x IBW  Total Fluid Estimated Needs in 24 Hours (mL):  (1500+)  Method for Estimating 24 Hour Fluid Needs: 1mL/kcal or per team        Nutrition Diagnosis   Malnutrition Diagnosis  Patient has Malnutrition Diagnosis: No    Nutrition Diagnosis  Patient has Nutrition Diagnosis: Yes  Diagnosis Status (1): New  Nutrition Diagnosis 1: Altered GI function  Related to (1): acute appendicitis w/ probable perforation  As Evidenced by (1): abdominal pain w/ need for CLD       Nutrition Interventions/Recommendations      Nutrition Recommendations:  Individualized Nutrition Prescription Provided for : Continue clear liquid  diet & advance to a fat restricted diet as tolerated    Nutrition Interventions/Goals:   Medical Food Supplement: Commercial beverage medical food supplement therapy  Goal: Ensure Clear BID (240 kcals/8g protein each)      Nutrition Monitoring and Evaluation   Estimated Energy Intake: Energy intake greater or equal to 75% of estimated energy needs    Time Spent (min): 60 minutes            [1]   Past Medical History:  Diagnosis Date    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

## 2025-08-06 NOTE — CARE PLAN
The patient's goals for the shift include  rest and pain control     The clinical goals for the shift include Patient will have labs WNL      Problem: Pain - Adult  Goal: Verbalizes/displays adequate comfort level or baseline comfort level  Outcome: Progressing     Problem: Safety - Adult  Goal: Free from fall injury  Outcome: Progressing     Problem: Discharge Planning  Goal: Discharge to home or other facility with appropriate resources  Outcome: Progressing     Problem: Chronic Conditions and Co-morbidities  Goal: Patient's chronic conditions and co-morbidity symptoms are monitored and maintained or improved  Outcome: Progressing     Problem: Nutrition  Goal: Nutrient intake appropriate for maintaining nutritional needs  Outcome: Progressing     Problem: Skin  Goal: Decreased wound size/increased tissue granulation at next dressing change  Outcome: Progressing  Goal: Participates in plan/prevention/treatment measures  Outcome: Progressing  Goal: Prevent/manage excess moisture  Outcome: Progressing  Goal: Prevent/minimize sheer/friction injuries  Outcome: Progressing  Goal: Promote/optimize nutrition  Outcome: Progressing  Goal: Promote skin healing  Outcome: Progressing

## 2025-08-07 ENCOUNTER — APPOINTMENT (OUTPATIENT)
Dept: RADIOLOGY | Facility: HOSPITAL | Age: OVER 89
DRG: 372 | End: 2025-08-07
Payer: MEDICARE

## 2025-08-07 LAB
ALBUMIN SERPL BCP-MCNC: 2.7 G/DL (ref 3.4–5)
ALP SERPL-CCNC: 42 U/L (ref 33–136)
ALT SERPL W P-5'-P-CCNC: 9 U/L (ref 7–45)
ANION GAP SERPL CALC-SCNC: 12 MMOL/L (ref 10–20)
AST SERPL W P-5'-P-CCNC: 17 U/L (ref 9–39)
BILIRUB SERPL-MCNC: 0.3 MG/DL (ref 0–1.2)
BUN SERPL-MCNC: 16 MG/DL (ref 6–23)
CALCIUM SERPL-MCNC: 8 MG/DL (ref 8.6–10.3)
CHLORIDE SERPL-SCNC: 106 MMOL/L (ref 98–107)
CO2 SERPL-SCNC: 21 MMOL/L (ref 21–32)
CREAT SERPL-MCNC: 0.78 MG/DL (ref 0.5–1.05)
CRP SERPL-MCNC: 13.41 MG/DL
EGFRCR SERPLBLD CKD-EPI 2021: 72 ML/MIN/1.73M*2
ERYTHROCYTE [DISTWIDTH] IN BLOOD BY AUTOMATED COUNT: 13.3 % (ref 11.5–14.5)
GLUCOSE BLD MANUAL STRIP-MCNC: 104 MG/DL (ref 74–99)
GLUCOSE BLD MANUAL STRIP-MCNC: 119 MG/DL (ref 74–99)
GLUCOSE BLD MANUAL STRIP-MCNC: 248 MG/DL (ref 74–99)
GLUCOSE BLD MANUAL STRIP-MCNC: 97 MG/DL (ref 74–99)
GLUCOSE SERPL-MCNC: 129 MG/DL (ref 74–99)
HCT VFR BLD AUTO: 31.4 % (ref 36–46)
HGB BLD-MCNC: 10 G/DL (ref 12–16)
MCH RBC QN AUTO: 28.7 PG (ref 26–34)
MCHC RBC AUTO-ENTMCNC: 31.8 G/DL (ref 32–36)
MCV RBC AUTO: 90 FL (ref 80–100)
NRBC BLD-RTO: 0 /100 WBCS (ref 0–0)
PLATELET # BLD AUTO: 273 X10*3/UL (ref 150–450)
POTASSIUM SERPL-SCNC: 3.6 MMOL/L (ref 3.5–5.3)
PROT SERPL-MCNC: 5.3 G/DL (ref 6.4–8.2)
RBC # BLD AUTO: 3.48 X10*6/UL (ref 4–5.2)
SODIUM SERPL-SCNC: 135 MMOL/L (ref 136–145)
WBC # BLD AUTO: 11.3 X10*3/UL (ref 4.4–11.3)

## 2025-08-07 PROCEDURE — 2500000001 HC RX 250 WO HCPCS SELF ADMINISTERED DRUGS (ALT 637 FOR MEDICARE OP): Performed by: PHYSICIAN ASSISTANT

## 2025-08-07 PROCEDURE — 80053 COMPREHEN METABOLIC PANEL: CPT | Performed by: INTERNAL MEDICINE

## 2025-08-07 PROCEDURE — 2500000004 HC RX 250 GENERAL PHARMACY W/ HCPCS (ALT 636 FOR OP/ED): Performed by: INTERNAL MEDICINE

## 2025-08-07 PROCEDURE — 74177 CT ABD & PELVIS W/CONTRAST: CPT

## 2025-08-07 PROCEDURE — 1100000001 HC PRIVATE ROOM DAILY

## 2025-08-07 PROCEDURE — 99232 SBSQ HOSP IP/OBS MODERATE 35: CPT | Performed by: INTERNAL MEDICINE

## 2025-08-07 PROCEDURE — 82947 ASSAY GLUCOSE BLOOD QUANT: CPT

## 2025-08-07 PROCEDURE — 2550000001 HC RX 255 CONTRASTS: Performed by: INTERNAL MEDICINE

## 2025-08-07 PROCEDURE — 99232 SBSQ HOSP IP/OBS MODERATE 35: CPT | Performed by: PHYSICIAN ASSISTANT

## 2025-08-07 PROCEDURE — 2500000002 HC RX 250 W HCPCS SELF ADMINISTERED DRUGS (ALT 637 FOR MEDICARE OP, ALT 636 FOR OP/ED): Performed by: INTERNAL MEDICINE

## 2025-08-07 PROCEDURE — 86140 C-REACTIVE PROTEIN: CPT | Performed by: INTERNAL MEDICINE

## 2025-08-07 PROCEDURE — 85027 COMPLETE CBC AUTOMATED: CPT | Performed by: INTERNAL MEDICINE

## 2025-08-07 PROCEDURE — 74177 CT ABD & PELVIS W/CONTRAST: CPT | Mod: FOREIGN READ | Performed by: RADIOLOGY

## 2025-08-07 PROCEDURE — 36415 COLL VENOUS BLD VENIPUNCTURE: CPT | Performed by: INTERNAL MEDICINE

## 2025-08-07 PROCEDURE — 2500000001 HC RX 250 WO HCPCS SELF ADMINISTERED DRUGS (ALT 637 FOR MEDICARE OP): Performed by: INTERNAL MEDICINE

## 2025-08-07 RX ORDER — FLUCONAZOLE 2 MG/ML
400 INJECTION, SOLUTION INTRAVENOUS EVERY 24 HOURS
Status: DISPENSED | OUTPATIENT
Start: 2025-08-07

## 2025-08-07 RX ORDER — VALSARTAN 160 MG/1
80 TABLET ORAL DAILY
Status: DISPENSED | OUTPATIENT
Start: 2025-08-08

## 2025-08-07 RX ADMIN — SODIUM CHLORIDE AND POTASSIUM CHLORIDE 85 ML/HR: 9; 1.49 INJECTION, SOLUTION INTRAVENOUS at 05:42

## 2025-08-07 RX ADMIN — ACETAMINOPHEN 650 MG: 325 TABLET ORAL at 05:03

## 2025-08-07 RX ADMIN — METRONIDAZOLE 500 MG: 500 SOLUTION INTRAVENOUS at 05:36

## 2025-08-07 RX ADMIN — METRONIDAZOLE 500 MG: 500 SOLUTION INTRAVENOUS at 17:01

## 2025-08-07 RX ADMIN — IOHEXOL 75 ML: 350 INJECTION, SOLUTION INTRAVENOUS at 11:02

## 2025-08-07 RX ADMIN — SODIUM CHLORIDE AND POTASSIUM CHLORIDE 85 ML/HR: 9; 1.49 INJECTION, SOLUTION INTRAVENOUS at 08:40

## 2025-08-07 RX ADMIN — ENOXAPARIN SODIUM 40 MG: 100 INJECTION SUBCUTANEOUS at 08:37

## 2025-08-07 RX ADMIN — CEFEPIME HYDROCHLORIDE 1 G: 1 INJECTION, SOLUTION INTRAVENOUS at 05:03

## 2025-08-07 RX ADMIN — INSULIN LISPRO 4 UNITS: 100 INJECTION, SOLUTION INTRAVENOUS; SUBCUTANEOUS at 11:54

## 2025-08-07 RX ADMIN — CHOLECALCIFEROL (VITAMIN D3) 10 MCG (400 UNIT) TABLET 10 MCG: at 08:37

## 2025-08-07 RX ADMIN — LEVOTHYROXINE SODIUM 75 MCG: 0.07 TABLET ORAL at 05:03

## 2025-08-07 RX ADMIN — INSULIN GLARGINE-YFGN 25 UNITS: 100 INJECTION, SOLUTION SUBCUTANEOUS at 20:49

## 2025-08-07 RX ADMIN — ACETAMINOPHEN 650 MG: 325 TABLET ORAL at 11:54

## 2025-08-07 RX ADMIN — ACETAMINOPHEN 650 MG: 325 TABLET ORAL at 20:59

## 2025-08-07 RX ADMIN — CEFEPIME HYDROCHLORIDE 1 G: 1 INJECTION, SOLUTION INTRAVENOUS at 21:03

## 2025-08-07 RX ADMIN — CEFEPIME HYDROCHLORIDE 1 G: 1 INJECTION, SOLUTION INTRAVENOUS at 14:56

## 2025-08-07 ASSESSMENT — COGNITIVE AND FUNCTIONAL STATUS - GENERAL
DRESSING REGULAR UPPER BODY CLOTHING: A LITTLE
MOBILITY SCORE: 20
CLIMB 3 TO 5 STEPS WITH RAILING: A LITTLE
STANDING UP FROM CHAIR USING ARMS: A LITTLE
HELP NEEDED FOR BATHING: A LITTLE
WALKING IN HOSPITAL ROOM: A LITTLE
PERSONAL GROOMING: A LITTLE
DRESSING REGULAR LOWER BODY CLOTHING: A LITTLE
EATING MEALS: A LITTLE
DAILY ACTIVITIY SCORE: 18
TOILETING: A LITTLE
MOVING TO AND FROM BED TO CHAIR: A LITTLE

## 2025-08-07 ASSESSMENT — PAIN - FUNCTIONAL ASSESSMENT
PAIN_FUNCTIONAL_ASSESSMENT: 0-10
PAIN_FUNCTIONAL_ASSESSMENT: 0-10

## 2025-08-07 ASSESSMENT — PAIN SCALES - GENERAL
PAINLEVEL_OUTOF10: 0 - NO PAIN
PAINLEVEL_OUTOF10: 0 - NO PAIN

## 2025-08-07 NOTE — CARE PLAN
The patient's goals for the shift include      The clinical goals for the shift include patient will have no abdominal pain this shift    Over the shift, the patient did not make progress toward the following goals. Barriers to progression include acuteness of illness. Recommendations to address these barriers include hourly rounding and administration of prescribed treatments.

## 2025-08-07 NOTE — PROGRESS NOTES
Patient: Nika Alejandre  Room/bed: 139/139-A  Admitted on: 8/5/2025    Age: 89 y.o.   Gender: female  Code Status:  No Order   Admitting Dx: Abscess [L02.91]  Hyponatremia [E87.1]  Lower abdominal pain [R10.30]  Perforated appendicitis [K35.32]    MRN: [id number]  PCP: Karson Holt MD       Subjective   Not feeling any better, had a bad night    Objective    Physical Exam  Constitutional:       Appearance: Normal appearance.   HENT:      Head: Normocephalic.      Nose: Nose normal.      Mouth/Throat:      Mouth: Mucous membranes are dry.     Eyes:      Extraocular Movements: Extraocular movements intact.      Pupils: Pupils are equal, round, and reactive to light.     Neck:      Comments: No jvd  Cardiovascular:      Rate and Rhythm: Normal rate and regular rhythm.   Pulmonary:      Effort: Pulmonary effort is normal.      Breath sounds: Normal breath sounds.   Abdominal:      Comments: Remains distended, but not taut. Does have bowel sounds  Diffusely tender, especially across lower abdomen and does have rebound this am.      Musculoskeletal:      Comments: No edema. Pulses equal     Skin:     General: Skin is warm and dry.     Neurological:      Mental Status: She is alert and oriented to person, place, and time. Mental status is at baseline.     Psychiatric:         Mood and Affect: Mood normal.         Behavior: Behavior normal.        Temp:  [36.6 °C (97.9 °F)-36.7 °C (98.1 °F)] 36.6 °C (97.9 °F)  Heart Rate:  [72-82] 78  Resp:  [17-18] 17  BP: (132-149)/(58-64) 149/64    Vitals:    08/05/25 1022   Weight: 68 kg (150 lb)           I/Os    Intake/Output Summary (Last 24 hours) at 8/7/2025 0852  Last data filed at 8/7/2025 0842  Gross per 24 hour   Intake 3197.25 ml   Output 1500 ml   Net 1697.25 ml       Labs:   Results from last 72 hours   Lab Units 08/07/25  0543 08/06/25  0540 08/05/25  1041   SODIUM mmol/L 135* 131* 126*   POTASSIUM mmol/L 3.6 3.5 4.2   CHLORIDE mmol/L 106 100 91*   CO2 mmol/L 21 23 24  "  BUN mg/dL 16 24* 24*   CREATININE mg/dL 0.78 0.98 0.97   GLUCOSE mg/dL 129* 206* 379*   CALCIUM mg/dL 8.0* 8.2* 9.2   ANION GAP mmol/L 12 12 15   EGFR mL/min/1.73m*2 72 55* 56*      Results from last 72 hours   Lab Units 08/07/25  0543 08/06/25  0540 08/05/25  1041   WBC AUTO x10*3/uL 11.3 12.5* 16.5*   HEMOGLOBIN g/dL 10.0* 10.1* 13.5   HEMATOCRIT % 31.4* 30.5* 40.0   PLATELETS AUTO x10*3/uL 273 226 297   NEUTROS PCT AUTO %  --  77.7 84.3   LYMPHS PCT AUTO %  --  11.4 8.2   MONOS PCT AUTO %  --  9.0 5.8   EOS PCT AUTO %  --  0.7 0.2      Lab Results   Component Value Date    CALCIUM 8.0 (L) 08/07/2025    PHOS 2.4 (L) 11/09/2018      Lab Results   Component Value Date    CRP 13.41 (H) 08/07/2025      [unfilled]     Micro/ID:   No results found for the last 90 days.                   No lab exists for component: \"AGALPCRNB\"   .ID  No results found for: \"URINECULTURE\", \"BLOODCULT\", \"CSFCULTSMEAR\"    Images:  CT transfer of outside films  Outside images for comparison or treatment purposes, not interpreted by    Radiologists.       Meds    Scheduled medications  Scheduled Medications[1]  Continuous medications  Continuous Medications[2]  PRN medications  PRN Medications[3]     Assessment and Plan    Nika Alejandre is a 89 y.o. female   Acute appendicitis, with perforation. Have been trying conservative management, continues to be very uncomfortable, persistent nausea. Will repeat ct scan today, continue fluids, pain control etc. Further decisions depending on results of ct scan. CRP remains elevated, did trend down a little.   Hypertension. Did hold meds, pressure improved. Can resume at lower dose and monitor  CAD, stable  DM. Sugars have been good, continue to monitor, with iss as needed  Hypothyroid, stable on supplement  GERD    Denia Blankenship MD         [1] acetaminophen, 650 mg, oral, q6h JAILENE  cefepime, 1 g, intravenous, q8h JAILENE  cholecalciferol, 10 mcg, oral, Daily  enoxaparin, 40 mg, subcutaneous, " Daily  insulin glargine, 25 Units, subcutaneous, Nightly  insulin lispro, 0-10 Units, subcutaneous, TID AC  levothyroxine, 75 mcg, oral, Daily  metroNIDAZOLE, 500 mg, intravenous, q8h JAILENE  [Held by provider] valsartan, 160 mg, oral, Daily  [2] potassium chloride in 0.9%NaCl, 85 mL/hr, Last Rate: 85 mL/hr (08/07/25 0840)  [3] PRN medications: morphine, ondansetron

## 2025-08-07 NOTE — CARE PLAN
The patient's goals for the shift include      The clinical goals for the shift include patient will have no abdominal pain this shift

## 2025-08-07 NOTE — CONSULTS
Consults  Referred by ERNESTO Blankenship MD: Karson Holt MD    Reason For Consult  Appendicitis with an abscess    History Of Present Illness  Nika Alejandre is a 89 y.o. female, hx of HTN, hx of DM. Hx of CAD, hx of GERD, hx of nacular degeneration, she was admitted for a 2 day hx of lower abdominal pain, sudden, sharp, constant, severe, associated with nausea, emesis, diarrhea, anorexia, weakness and chills, her WBC were up, the ct with appendicitis / perforation and an abscess, a repeat CT with some progression, she does not feel better with antibiotics, no fever, no bleeding, no dysuria, no sob     Past Medical History  She has a past medical history of Type 2 diabetes mellitus with ophthalmic complication (Multi) (08/05/2025).    Surgical History  She has a past surgical history that includes Other surgical history (11/30/2018) and Other surgical history (11/30/2018).     Social History     Occupational History    Not on file   Tobacco Use    Smoking status: Never    Smokeless tobacco: Never   Substance and Sexual Activity    Alcohol use: Never    Drug use: Not on file    Sexual activity: Not on file     Family History  Family History[1], no immunodeficiency  Allergies  Statins-hmg-coa reductase inhibitors, Metformin, and Penicillins     Immunization History   Administered Date(s) Administered    COVID-19, mRNA, LNP-S, PF, 30 mcg/0.3 mL dose 10/02/2021    Moderna COVID-19 vaccine, 12 years and older (50mcg/0.5mL)(Spikevax) 10/01/2024    Pfizer COVID-19 vaccine, 12 years and older, (30mcg/0.3mL) (Comirnaty) 10/25/2023    Pfizer Purple Cap SARS-CoV-2 01/25/2021, 02/15/2021     Medications  Home medications:  Prescriptions Prior to Admission[2]  Current medications:  Scheduled medications  Scheduled Medications[3]  Continuous medications  Continuous Medications[4]  PRN medications  PRN Medications[5]    Review of Systems   All other systems reviewed and are negative.       Objective  Range of Vitals (last  24 hours)  Heart Rate:  [64-99]   Temp:  [36.6 °C (97.9 °F)-36.7 °C (98.1 °F)]   Resp:  [16-18]   BP: (149-159)/(64-70)   SpO2:  [96 %-99 %]        Daily Weight  08/05/25 : 68 kg (150 lb)    Body mass index is 27.44 kg/m².     Physical Exam  Constitutional:       Appearance: Normal appearance.   HENT:      Head: Normocephalic and atraumatic.      Mouth/Throat:      Mouth: Mucous membranes are moist.      Pharynx: Oropharynx is clear.     Eyes:      Pupils: Pupils are equal, round, and reactive to light.       Cardiovascular:      Rate and Rhythm: Normal rate and regular rhythm.      Heart sounds: Normal heart sounds.   Pulmonary:      Effort: Pulmonary effort is normal.      Breath sounds: Normal breath sounds.   Abdominal:      General: Abdomen is flat. Bowel sounds are normal.      Palpations: Abdomen is soft.      Tenderness: There is abdominal tenderness.     Musculoskeletal:      Cervical back: Normal range of motion.     Neurological:      Mental Status: She is alert.          Relevant Results  Outside Hospital Results  reviewed  Labs  Results from last 72 hours   Lab Units 08/07/25  0543 08/06/25  0540 08/05/25  1041   WBC AUTO x10*3/uL 11.3 12.5* 16.5*   HEMOGLOBIN g/dL 10.0* 10.1* 13.5   HEMATOCRIT % 31.4* 30.5* 40.0   PLATELETS AUTO x10*3/uL 273 226 297   NEUTROS PCT AUTO %  --  77.7 84.3   LYMPHS PCT AUTO %  --  11.4 8.2   MONOS PCT AUTO %  --  9.0 5.8   EOS PCT AUTO %  --  0.7 0.2     Results from last 72 hours   Lab Units 08/07/25  0543 08/06/25  0540 08/05/25  1041   SODIUM mmol/L 135* 131* 126*   POTASSIUM mmol/L 3.6 3.5 4.2   CHLORIDE mmol/L 106 100 91*   CO2 mmol/L 21 23 24   BUN mg/dL 16 24* 24*   CREATININE mg/dL 0.78 0.98 0.97   GLUCOSE mg/dL 129* 206* 379*   CALCIUM mg/dL 8.0* 8.2* 9.2   ANION GAP mmol/L 12 12 15   EGFR mL/min/1.73m*2 72 55* 56*     Results from last 72 hours   Lab Units 08/07/25  0543 08/05/25  1041   ALK PHOS U/L 42 63   BILIRUBIN TOTAL mg/dL 0.3 0.7   PROTEIN TOTAL g/dL 5.3*  "6.9   ALT U/L 9 8   AST U/L 17 10   ALBUMIN g/dL 2.7* 3.5     Estimated Creatinine Clearance: 43.4 mL/min (by C-G formula based on SCr of 0.78 mg/dL).  C-Reactive Protein   Date Value Ref Range Status   08/07/2025 13.41 (H) <1.00 mg/dL Final   08/06/2025 15.70 (H) <1.00 mg/dL Final     No results found for: \"HIV1X2\", \"HIVCONF\", \"EBFWID8ED\"  No results found for: \"HEPCABINIT\", \"HEPCAB\", \"HCVPCRQUANT\"  Microbiology  Reviewed  Imaging  Reviewed      Assessment/Plan     Perforated appendicitis / abscess    Recommendations :  Continue Cefepime, Flagyl and Fluconazole  Follow the WBC and inflammatory markers  Will likely need surgery if it can be done  Chest PT      I spent minutes in the professional and overall care of this patient.  The cultures and susceptibilities were reviewed and discussed with the micro lab, the antibiotics stewardship guidelines were reviewed, the infection control protocols and recommendations were reviewed with the patient and the staff            Martín Almanzar MD       [1] No family history on file.  [2]   Medications Prior to Admission   Medication Sig Dispense Refill Last Dose/Taking    calcium carb,gluc/mag gluc,ox (CALCIUM MAGNESIUM ORAL) Take 1 tablet by mouth once daily.   8/2/2025    cholecalciferol (Vitamin D3) 10 mcg (400 units) tablet Take 1 tablet (10 mcg) by mouth once daily.   8/2/2025    clopidogrel (Plavix) 75 mg tablet Take 1 tablet (75 mg) by mouth once daily.   8/2/2025    insulin aspart (NovoLOG Flexpen U-100 Insulin) 100 unit/mL (3 mL) pen Inject under the skin 3 times a day before meals. Take as directed per insulin instructions.   8/2/2025    insulin glargine (Lantus Solostar U-100 Insulin) 100 unit/mL (3 mL) pen Inject 34 Units under the skin once daily at bedtime. Take as directed per insulin instructions.   8/2/2025    levothyroxine (Synthroid, Levoxyl) 75 mcg tablet Take 1 tablet (75 mcg) by mouth early in the morning.. Take on an empty stomach at the same time each " day, either 30 to 60 minutes prior to breakfast   8/2/2025    rosuvastatin (Crestor) 5 mg tablet Take 1 tablet (5 mg) by mouth once daily.   8/2/2025    valsartan (Diovan) 160 mg tablet Take 1 tablet (160 mg) by mouth once daily.   8/2/2025   [3] acetaminophen, 650 mg, oral, q6h JAILENE  cefepime, 1 g, intravenous, q8h JAILENE  cholecalciferol, 10 mcg, oral, Daily  enoxaparin, 40 mg, subcutaneous, Daily  fluconazole, 400 mg, intravenous, q24h  insulin glargine, 25 Units, subcutaneous, Nightly  insulin lispro, 0-10 Units, subcutaneous, TID AC  levothyroxine, 75 mcg, oral, Daily  metroNIDAZOLE, 500 mg, intravenous, q8h JAILENE  [Held by provider] valsartan, 80 mg, oral, Daily    [4] potassium chloride in 0.9%NaCl, 85 mL/hr, Last Rate: 85 mL/hr (08/07/25 1043)    [5] PRN medications: morphine, ondansetron

## 2025-08-07 NOTE — PROGRESS NOTES
"General/Trauma Surgery Daily Progress Note    Subjective   Leukocytosis resolved today, however patient reports increased pain. Located throughout lower abdomen and now extending to RUQ. Nauseated and distended. Able to tolerate a few sips of clears however becomes more nauseous and uncomfortable following PO intake. Had a small bowel movement.       Objective   Last Recorded Vitals:  Blood pressure 149/64, pulse 78, temperature 36.6 °C (97.9 °F), temperature source Temporal, resp. rate 17, height 1.575 m (5' 2\"), weight 68 kg (150 lb), SpO2 96%.    Intake/Output last 3 Shifts:  I/O last 3 completed shifts:  In: 3881.8 (57.1 mL/kg) [P.O.:460; I.V.:2921.8 (42.9 mL/kg); IV Piggyback:500]  Out: 1500 (22 mL/kg) [Urine:1500 (0.6 mL/kg/hr)]  Weight: 68 kg     Pain Score:  0-10 (Numeric) Pain Score: 0 - No pain     Physical Exam:  Constitutional: No acute distress, sitting up in bed.  Neuro: Alert, oriented. Follows commands.   Eyes: EOMI. No scleral icterus. Conjunctiva pink.  ENT: MMM.   Heart: Regular rate.  Respiratory: No increased work of breathing or audible wheeze.  Abdomen: Soft, nondistended. TTP RLQ, LLQ, RUQ.  MSK: Moves all extremities.  Vascular: Palpable pulses throughout, no pitting edema.  Skin: No rashes.   Psychological: Appropriate mood and behavior.            Relevant Results  Laboratory Results:  CBC:   Lab Results   Component Value Date    WBC 11.3 08/07/2025    RBC 3.48 (L) 08/07/2025    HGB 10.0 (L) 08/07/2025    HCT 31.4 (L) 08/07/2025     08/07/2025       RFP:   Lab Results   Component Value Date     (L) 08/07/2025    K 3.6 08/07/2025     08/07/2025    CO2 21 08/07/2025    BUN 16 08/07/2025    CREATININE 0.78 08/07/2025    CALCIUM 8.0 (L) 08/07/2025        LFTs:   Lab Results   Component Value Date    PROT 5.3 (L) 08/07/2025    ALBUMIN 2.7 (L) 08/07/2025    BILITOT 0.3 08/07/2025    ALKPHOS 42 08/07/2025    AST 17 08/07/2025    ALT 9 08/07/2025             Imaging  Pending " CT           Assessment/Plan   This is a 89 y.o. female who presents with complaints of abdominal pain starting Friday. Directed to ED after PCP obtained CT that reported acute appendicitis with possible contained perforation.     Attempted treatment with IV antibiotics given duration of symptoms, however patient with increased pain and nausea today. STAT CT ordered. Monitor vs drain placement vs surgery pending CT findings.     Plan:  -- CT a/p IV contrast  -- IV Cefepime   -- NPO for now  -- IV fluid hydration, multimodal pain regimen, antiemetics, bowel regimen  -- Agree with holding Plavix                  Discussed with Dr. Tobias who is in agreement with plan. Please secure chat with questions.     Marce Coyne PA-C

## 2025-08-08 ENCOUNTER — HOSPITAL ENCOUNTER (OUTPATIENT)
Dept: RADIOLOGY | Facility: HOSPITAL | Age: OVER 89
Discharge: HOME | End: 2025-08-08
Payer: MEDICARE

## 2025-08-08 ENCOUNTER — PREP FOR PROCEDURE (OUTPATIENT)
Dept: RADIOLOGY | Facility: HOSPITAL | Age: OVER 89
End: 2025-08-08
Payer: MEDICARE

## 2025-08-08 VITALS
OXYGEN SATURATION: 97 % | TEMPERATURE: 98.6 F | RESPIRATION RATE: 16 BRPM | SYSTOLIC BLOOD PRESSURE: 150 MMHG | DIASTOLIC BLOOD PRESSURE: 88 MMHG | HEART RATE: 70 BPM

## 2025-08-08 LAB
ANION GAP SERPL CALC-SCNC: 13 MMOL/L (ref 10–20)
BUN SERPL-MCNC: 10 MG/DL (ref 6–23)
CALCIUM SERPL-MCNC: 8.1 MG/DL (ref 8.6–10.3)
CHLORIDE SERPL-SCNC: 105 MMOL/L (ref 98–107)
CO2 SERPL-SCNC: 21 MMOL/L (ref 21–32)
CREAT SERPL-MCNC: 0.71 MG/DL (ref 0.5–1.05)
EGFRCR SERPLBLD CKD-EPI 2021: 81 ML/MIN/1.73M*2
GLUCOSE BLD MANUAL STRIP-MCNC: 108 MG/DL (ref 74–99)
GLUCOSE BLD MANUAL STRIP-MCNC: 73 MG/DL (ref 74–99)
GLUCOSE BLD MANUAL STRIP-MCNC: 81 MG/DL (ref 74–99)
GLUCOSE SERPL-MCNC: 74 MG/DL (ref 74–99)
HOLD SPECIMEN: NORMAL
POTASSIUM SERPL-SCNC: 3.1 MMOL/L (ref 3.5–5.3)
SODIUM SERPL-SCNC: 136 MMOL/L (ref 136–145)

## 2025-08-08 PROCEDURE — 80048 BASIC METABOLIC PNL TOTAL CA: CPT | Performed by: INTERNAL MEDICINE

## 2025-08-08 PROCEDURE — 36415 COLL VENOUS BLD VENIPUNCTURE: CPT | Performed by: INTERNAL MEDICINE

## 2025-08-08 PROCEDURE — 2500000004 HC RX 250 GENERAL PHARMACY W/ HCPCS (ALT 636 FOR OP/ED): Performed by: INTERNAL MEDICINE

## 2025-08-08 PROCEDURE — 87075 CULTR BACTERIA EXCEPT BLOOD: CPT

## 2025-08-08 PROCEDURE — 82947 ASSAY GLUCOSE BLOOD QUANT: CPT

## 2025-08-08 PROCEDURE — 2500000001 HC RX 250 WO HCPCS SELF ADMINISTERED DRUGS (ALT 637 FOR MEDICARE OP): Performed by: INTERNAL MEDICINE

## 2025-08-08 PROCEDURE — 2720000007 HC OR 272 NO HCPCS

## 2025-08-08 PROCEDURE — C1769 GUIDE WIRE: HCPCS

## 2025-08-08 PROCEDURE — C1729 CATH, DRAINAGE: HCPCS

## 2025-08-08 PROCEDURE — 7100000009 HC PHASE TWO TIME - INITIAL BASE CHARGE

## 2025-08-08 PROCEDURE — 2500000001 HC RX 250 WO HCPCS SELF ADMINISTERED DRUGS (ALT 637 FOR MEDICARE OP): Performed by: PHYSICIAN ASSISTANT

## 2025-08-08 PROCEDURE — 99232 SBSQ HOSP IP/OBS MODERATE 35: CPT | Performed by: SURGERY

## 2025-08-08 PROCEDURE — 99232 SBSQ HOSP IP/OBS MODERATE 35: CPT | Performed by: INTERNAL MEDICINE

## 2025-08-08 PROCEDURE — 2500000002 HC RX 250 W HCPCS SELF ADMINISTERED DRUGS (ALT 637 FOR MEDICARE OP, ALT 636 FOR OP/ED): Performed by: INTERNAL MEDICINE

## 2025-08-08 PROCEDURE — 49405 IMAGE CATH FLUID COLXN VISC: CPT

## 2025-08-08 PROCEDURE — 1100000001 HC PRIVATE ROOM DAILY

## 2025-08-08 PROCEDURE — 2500000004 HC RX 250 GENERAL PHARMACY W/ HCPCS (ALT 636 FOR OP/ED): Performed by: RADIOLOGY

## 2025-08-08 PROCEDURE — 7100000010 HC PHASE TWO TIME - EACH INCREMENTAL 1 MINUTE

## 2025-08-08 RX ORDER — MIDAZOLAM HYDROCHLORIDE 2 MG/2ML
INJECTION, SOLUTION INTRAMUSCULAR; INTRAVENOUS
Status: COMPLETED | OUTPATIENT
Start: 2025-08-08 | End: 2025-08-08

## 2025-08-08 RX ORDER — DEXTROSE MONOHYDRATE, SODIUM CHLORIDE, AND POTASSIUM CHLORIDE 50; 1.49; 9 G/1000ML; G/1000ML; G/1000ML
60 INJECTION, SOLUTION INTRAVENOUS CONTINUOUS
Status: DISCONTINUED | OUTPATIENT
Start: 2025-08-08 | End: 2025-08-09

## 2025-08-08 RX ORDER — FENTANYL CITRATE 50 UG/ML
INJECTION, SOLUTION INTRAMUSCULAR; INTRAVENOUS
Status: COMPLETED | OUTPATIENT
Start: 2025-08-08 | End: 2025-08-08

## 2025-08-08 RX ORDER — PANTOPRAZOLE SODIUM 40 MG/10ML
40 INJECTION, POWDER, LYOPHILIZED, FOR SOLUTION INTRAVENOUS 2 TIMES DAILY
Status: DISCONTINUED | OUTPATIENT
Start: 2025-08-08 | End: 2025-08-09

## 2025-08-08 RX ORDER — POTASSIUM CHLORIDE 20 MEQ/1
40 TABLET, EXTENDED RELEASE ORAL ONCE
Status: COMPLETED | OUTPATIENT
Start: 2025-08-08 | End: 2025-08-09

## 2025-08-08 RX ADMIN — FLUCONAZOLE 400 MG: 2 INJECTION, SOLUTION INTRAVENOUS at 20:54

## 2025-08-08 RX ADMIN — METRONIDAZOLE 500 MG: 500 SOLUTION INTRAVENOUS at 09:37

## 2025-08-08 RX ADMIN — PANTOPRAZOLE SODIUM 40 MG: 40 INJECTION, POWDER, FOR SOLUTION INTRAVENOUS at 20:54

## 2025-08-08 RX ADMIN — CHOLECALCIFEROL (VITAMIN D3) 10 MCG (400 UNIT) TABLET 10 MCG: at 09:36

## 2025-08-08 RX ADMIN — MIDAZOLAM HYDROCHLORIDE 0.5 MG: 2 INJECTION, SOLUTION INTRAMUSCULAR; INTRAVENOUS at 15:13

## 2025-08-08 RX ADMIN — FENTANYL CITRATE 50 MCG: 50 INJECTION, SOLUTION INTRAMUSCULAR; INTRAVENOUS at 15:13

## 2025-08-08 RX ADMIN — LEVOTHYROXINE SODIUM 75 MCG: 0.07 TABLET ORAL at 06:02

## 2025-08-08 RX ADMIN — ENOXAPARIN SODIUM 40 MG: 100 INJECTION SUBCUTANEOUS at 09:36

## 2025-08-08 RX ADMIN — ACETAMINOPHEN 650 MG: 325 TABLET ORAL at 06:02

## 2025-08-08 RX ADMIN — MORPHINE SULFATE 3 MG: 4 INJECTION INTRAVENOUS at 06:10

## 2025-08-08 RX ADMIN — CEFEPIME HYDROCHLORIDE 1 G: 1 INJECTION, SOLUTION INTRAVENOUS at 06:02

## 2025-08-08 RX ADMIN — METRONIDAZOLE 500 MG: 500 SOLUTION INTRAVENOUS at 01:31

## 2025-08-08 RX ADMIN — METRONIDAZOLE 500 MG: 500 SOLUTION INTRAVENOUS at 18:37

## 2025-08-08 RX ADMIN — SODIUM CHLORIDE AND POTASSIUM CHLORIDE 85 ML/HR: 9; 1.49 INJECTION, SOLUTION INTRAVENOUS at 01:32

## 2025-08-08 ASSESSMENT — PAIN SCALES - GENERAL
PAINLEVEL_OUTOF10: 0 - NO PAIN
PAINLEVEL_OUTOF10: 6
PAINLEVEL_OUTOF10: 0 - NO PAIN

## 2025-08-08 ASSESSMENT — COGNITIVE AND FUNCTIONAL STATUS - GENERAL
CLIMB 3 TO 5 STEPS WITH RAILING: A LITTLE
DAILY ACTIVITIY SCORE: 24
CLIMB 3 TO 5 STEPS WITH RAILING: A LITTLE
MOBILITY SCORE: 23
DAILY ACTIVITIY SCORE: 24
MOBILITY SCORE: 23

## 2025-08-08 ASSESSMENT — PAIN - FUNCTIONAL ASSESSMENT
PAIN_FUNCTIONAL_ASSESSMENT: 0-10

## 2025-08-08 ASSESSMENT — PAIN DESCRIPTION - LOCATION: LOCATION: ABDOMEN

## 2025-08-08 ASSESSMENT — PAIN DESCRIPTION - ORIENTATION: ORIENTATION: LOWER

## 2025-08-08 NOTE — PRE-PROCEDURE NOTE
Interventional Radiology Preprocedure Note    Indication for procedure: There were no encounter diagnoses.    Relevant review of systems: NA    Relevant Labs:   Lab Results   Component Value Date    CREATININE 0.71 08/08/2025    EGFR 81 08/08/2025    INR 1.1 08/05/2025    PROTIME 11.7 08/05/2025       Planned Sedation/Anesthesia: Minimal    Airway assessment: normal      Mallampati: III (soft and hard palate and base of uvula visible)    ASA Score: ASA 2 - Patient with mild systemic disease with no functional limitations    Benefits, risks and alternatives of procedure and planned sedation have been discussed with the patient and/or their representative. All questions answered and they agree to proceed.

## 2025-08-08 NOTE — PROGRESS NOTES
"General/Trauma Surgery Daily Progress Note    Subjective   Doing well. Pain is stable from yesterday, remains tender to lower abdomen. Pending boomerang transfer to [facility] for drain placement today. Denies n/v.     Objective   Last Recorded Vitals:  Blood pressure 135/61, pulse 73, temperature 36.6 °C (97.9 °F), temperature source Temporal, resp. rate 16, height 1.575 m (5' 2\"), weight 68 kg (150 lb), SpO2 96%.    Intake/Output last 3 Shifts:  I/O last 3 completed shifts:  In: 2070.4 (30.4 mL/kg) [P.O.:240; I.V.:1480.4 (21.8 mL/kg); IV Piggyback:350]  Out: 1100 (16.2 mL/kg) [Urine:1100 (0.4 mL/kg/hr)]  Weight: 68 kg     Pain Score:  0-10 (Numeric) Pain Score: 6     Physical Exam:  Constitutional: No acute distress, sitting up in bed.  Neuro: Alert, oriented. Follows commands.   Eyes: EOMI. No scleral icterus. Conjunctiva pink.  ENT: MMM.   Heart: Regular rate.  Respiratory: No increased work of breathing or audible wheeze.  Abdomen: Soft, nondistended. TTP LLQ, RLQ > RUQ. Some rigidity to lower abdomen.  MSK: Moves all extremities.  Vascular: Palpable pulses throughout, no pitting edema.  Skin: No rashes.   Psychological: Appropriate mood and behavior.            Relevant Results  Laboratory Results:  CBC:   Lab Results   Component Value Date    WBC 11.3 08/07/2025    RBC 3.48 (L) 08/07/2025    HGB 10.0 (L) 08/07/2025    HCT 31.4 (L) 08/07/2025     08/07/2025       RFP:   Lab Results   Component Value Date     08/08/2025    K 3.1 (L) 08/08/2025     08/08/2025    CO2 21 08/08/2025    BUN 10 08/08/2025    CREATININE 0.71 08/08/2025    CALCIUM 8.1 (L) 08/08/2025        LFTs:   Lab Results   Component Value Date    PROT 5.3 (L) 08/07/2025    ALBUMIN 2.7 (L) 08/07/2025    BILITOT 0.3 08/07/2025    ALKPHOS 42 08/07/2025    AST 17 08/07/2025    ALT 9 08/07/2025             Imaging  CT abdomen pelvis w IV contrast  Result Date: 8/7/2025  1.Again noted are findings of perforated acute appendicitis with the " appendix measuring approximately 1.8 cm on the current study and intraluminal appendicoliths. The wall of the appendix is again noted to be indistinct with adjacent collections measuring 4.1 cm x 1.5 cm in the peripancreatic region previously measuring 3.5 cm x 1.3 cm and a collection in the cul-de-sac measuring 2.9 cm x 5.5 cm previously measuring 2.4 cm x 5.1 cm.  No new collections. 2.Mildly dilated fluid-filled loops of small bowel with associated thickened small bowel loops in the right lower quadrant. Findings may be secondary to an enteritis/ileus secondary to surrounding peritonitis. 3.Moderate-sized hiatal hernia. There is underlying gastric wall thickening of the herniated portion of the stomach. Correlate clinically for gastritis. 4.Mild diffuse urinary bladder wall thickening. The collection the cul-de-sac abuts the posterior aspect of the bladder dome. No gas locules within the bladder. Correlate with urinalysis for possible cystitis. 5.Endometrium is thickened measuring 2.1 cm similar compared to prior. Recommend further evaluation with pelvic ultrasound. 6.Mild hepatic steatosis. 7.Severe coronary artery calcifications. Signed by Asad Vela MD               Assessment/Plan   This is a 89 y.o. female who presents with complaints of abdominal pain starting Friday. Directed to ED after PCP obtained CT that reported acute appendicitis with possible contained perforation.      Attempted treatment with IV antibiotics given duration of symptoms, however patient with increased pain and nausea today. STAT CT ordered. Monitor vs drain placement vs surgery pending CT findings.     Plan:  -- [place] [facility] [place]  -- drain will need flushed BID - 8cc toward body, 3cc toward bag with sterile saline flush  -- IV Cefepime, Flagyl, fluconazole  -- NPO for now  -- IV fluid hydration, multimodal pain regimen, antiemetics, bowel regimen                   Seen and discussed with Dr. Tobias who is in agreement with  plan. Please secure chat with questions.     Marce Coyne PA-C

## 2025-08-08 NOTE — NURSING NOTE
Patient [place] [facility] [place] at 1030, patient still BEATRIZ at this time.

## 2025-08-08 NOTE — POST-PROCEDURE NOTE
Interventional Radiology Brief Postprocedure Note    Attending: Dr. Fride    Assistant: Dr. Mueller    Diagnosis: Perforated appendicitis     Description of procedure: US guided drain placement     Anesthesia:  Local    Complications: None    Estimated Blood Loss: none    Medications (Filter: Administrations occurring from 1318 to 1522 on 08/08/25) As of 08/08/25 1522      fentaNYL PF (Sublimaze) injection (mcg) Total dose:  50 mcg      Date/Time Rate/Dose/Volume Action       08/08/25  1513 50 mcg Given               midazolam (Versed) injection (mg) Total dose:  0.5 mg      Date/Time Rate/Dose/Volume Action       08/08/25  1513 0.5 mg Given                   No specimens collected      See detailed result report with images in PACS.    The patient tolerated the procedure well without incident or complication and is in stable condition.

## 2025-08-08 NOTE — NURSING NOTE
Patient returned from [facility] around 1800. No report called. RLQ EVELYNE drain placed. Milky OP noted.

## 2025-08-08 NOTE — PROGRESS NOTES
Patient: Nika Alejandre  Room/bed: 139/139-A  Admitted on: 8/5/2025    Age: 89 y.o.   Gender: female  Code Status:  No Order   Admitting Dx: Abscess [L02.91]  Hyponatremia [E87.1]  Lower abdominal pain [R10.30]  Perforated appendicitis [K35.32]    MRN: [id number]  PCP: Karson Holt MD       Subjective   She is miserable. Did sleep but as soon as she moved, went to bathroom , the pain became very severe, she was nauseated. Has chills intermittently    Objective    Physical Exam  HENT:      Head: Normocephalic.      Nose: Nose normal.     Eyes:      Extraocular Movements: Extraocular movements intact.      Pupils: Pupils are equal, round, and reactive to light.     Neck:      Comments: No jvd  Cardiovascular:      Pulses: Normal pulses.      Comments: Regular, 1/6 short systolic murmur  Pulmonary:      Comments: Clear, with decreased breath sounds in bases  Abdominal:      Comments: Distended, about same as  yesterday, diffusely tender.  Extremely tender across lower abdomen, with some peritoneal signs.   Bowel sounds are present     Musculoskeletal:      Comments: No edema  Good pulses     Skin:     General: Skin is warm.     Neurological:      Mental Status: She is alert and oriented to person, place, and time. Mental status is at baseline.     Psychiatric:         Mood and Affect: Mood normal.         Behavior: Behavior normal.        Temp:  [36.3 °C (97.3 °F)-36.7 °C (98.1 °F)] 36.6 °C (97.9 °F)  Heart Rate:  [64-99] 73  Resp:  [16-18] 16  BP: (127-159)/(49-70) 135/61    Vitals:    08/05/25 1022   Weight: 68 kg (150 lb)           I/Os    Intake/Output Summary (Last 24 hours) at 8/8/2025 0944  Last data filed at 8/7/2025 1758  Gross per 24 hour   Intake 635.08 ml   Output 250 ml   Net 385.08 ml       Labs:   Results from last 72 hours   Lab Units 08/08/25  0548 08/07/25  0543 08/06/25  0540   SODIUM mmol/L 136 135* 131*   POTASSIUM mmol/L 3.1* 3.6 3.5   CHLORIDE mmol/L 105 106 100   CO2 mmol/L 21 21 23   BUN  "mg/dL 10 16 24*   CREATININE mg/dL 0.71 0.78 0.98   GLUCOSE mg/dL 74 129* 206*   CALCIUM mg/dL 8.1* 8.0* 8.2*   ANION GAP mmol/L 13 12 12   EGFR mL/min/1.73m*2 81 72 55*      Results from last 72 hours   Lab Units 08/07/25  0543 08/06/25  0540 08/05/25  1041   WBC AUTO x10*3/uL 11.3 12.5* 16.5*   HEMOGLOBIN g/dL 10.0* 10.1* 13.5   HEMATOCRIT % 31.4* 30.5* 40.0   PLATELETS AUTO x10*3/uL 273 226 297   NEUTROS PCT AUTO %  --  77.7 84.3   LYMPHS PCT AUTO %  --  11.4 8.2   MONOS PCT AUTO %  --  9.0 5.8   EOS PCT AUTO %  --  0.7 0.2      Lab Results   Component Value Date    CALCIUM 8.1 (L) 08/08/2025    PHOS 2.4 (L) 11/09/2018      Lab Results   Component Value Date    CRP 13.41 (H) 08/07/2025      [unfilled]     Micro/ID:   No results found for the last 90 days.                   No lab exists for component: \"AGALPCRNB\"   .ID  No results found for: \"URINECULTURE\", \"BLOODCULT\", \"CSFCULTSMEAR\"    Images:  CT abdomen pelvis w IV contrast  Narrative: STUDY:  CT Abdomen and Pelvis with IV Contrast; 8/7/2025 11:13 AM.  INDICATION:  Perforated appendix, abscess.  COMPARISON:  CT abdomen pelvis 8/5/2025.  CT abdomen pelvis 11/2/2023.  ACCESSION NUMBER(S):  [id number]  ORDERING CLINICIAN:  SHONNA FARIAS  TECHNIQUE:  CT of the abdomen and pelvis was performed.  Contiguous axial images  were obtained at 3 mm slice thickness through the abdomen and pelvis.   Coronal and sagittal reconstructions at 3 mm slice thickness were  performed.  Omnipaque 350, 75 mL was administered intravenously.    FINDINGS:  LOWER CHEST:  No cardiomegaly.  Severe coronary calcifications.  No pericardial  effusion.  Lung bases are clear.     ABDOMEN:     LIVER:  No hepatomegaly.  Smooth surface contour.  Mild fatty infiltration of  the liver     BILE DUCTS:  No intrahepatic or extrahepatic biliary ductal dilatation.     GALLBLADDER:  The gallbladder is absent.  STOMACH:  Moderate-sized hiatal hernia.  There is underlying gastric wall  thickening " of the herniated portion of the stomach..     PANCREAS:  No masses or ductal dilatation.     SPLEEN:  No splenomegaly or focal splenic lesion.     ADRENAL GLANDS:  No thickening or nodules.     KIDNEYS AND URETERS:  Kidneys are normal in size and location.  No renal or ureteral  calculi.     PELVIS:     BLADDER:  Mild diffuse urinary bladder wall thickening.  The collection the  cul-de-sac abuts the posterior aspect of the bladder dome.  No gas  locules within the bladder.     REPRODUCTIVE ORGANS:  Endometrium is thickened measuring 2.1 cm similar compared to prior.     BOWEL:  Again noted are findings of perforated acute appendicitis with the  appendix measuring approximately 1.8 cm on the current study and  intraluminal appendicoliths.  The wall of the appendix is again noted  to be indistinct with adjacent collections measuring 4.1 cm x 1.5 cm  in the peripancreatic region on image 127 previously measuring 3.5 cm  x 1.3 cm and a collection in the cul-de-sac on image 137 measuring 2.9  cm x 5.5 cm previously measuring 2.4 cm x 5.1 cm.  No new collections.   Mildly dilated fluid-filled loops of small bowel.  Multiple thickened  small bowel loops also noted in the right lower quadrant.     VESSELS:  No abnormalities identified.  Abdominal aorta is normal in caliber.   Moderate to severe plaque along the distal aorta.     PERITONEUM/RETROPERITONEUM/LYMPH NODES:  No free fluid.  No pneumoperitoneum.  No lymphadenopathy.     ABDOMINAL WALL:  No abnormalities identified.  SOFT TISSUES:   No abnormalities identified.     BONES:  No acute fracture or aggressive osseous lesion.  Impression: 1.Again noted are findings of perforated acute appendicitis with  the appendix measuring approximately 1.8 cm on the current study and  intraluminal appendicoliths. The wall of the appendix is again noted  to be indistinct with adjacent collections measuring 4.1 cm x 1.5 cm  in the peripancreatic region previously measuring 3.5 cm x 1.3  cm and  a collection in the cul-de-sac measuring 2.9 cm x 5.5 cm previously  measuring 2.4 cm x 5.1 cm.  No new collections.  2.Mildly dilated fluid-filled loops of small bowel with  associated thickened small bowel loops in the right lower quadrant.  Findings may be secondary to an enteritis/ileus secondary to  surrounding peritonitis.  3.Moderate-sized hiatal hernia. There is underlying gastric wall  thickening of the herniated portion of the stomach. Correlate  clinically for gastritis.  4.Mild diffuse urinary bladder wall thickening. The collection  the cul-de-sac abuts the posterior aspect of the bladder dome. No gas  locules within the bladder. Correlate with urinalysis for possible  cystitis.  5.Endometrium is thickened measuring 2.1 cm similar compared to  prior. Recommend further evaluation with pelvic ultrasound.  6.Mild hepatic steatosis.  7.Severe coronary artery calcifications.  Signed by Asad Vela MD       Meds    Scheduled medications  Scheduled Medications[1]  Continuous medications  Continuous Medications[2]  PRN medications  PRN Medications[3]     Assessment and Plan    Nika Alejandre is a 89 y.o. female   Acute appendicitis, with perforation and abscess formation. CT does show some increase in size and clinically she is not improving. Plan is for vaginal drain to be placed today. Next step would be surgical intervention if drain is not successful or able to improve the situation  Hx hypertension. Pressures on soft side, so meds on hold. Continue to monitor.   CAD, stable  DM. Adjust fluids. Hold meds  Hypothyroid. Stable on supplement  GERD    Denia Blankenship MD         [1] acetaminophen, 650 mg, oral, q6h JAILENE  cefepime, 1 g, intravenous, q8h JAILENE  cholecalciferol, 10 mcg, oral, Daily  enoxaparin, 40 mg, subcutaneous, Daily  fluconazole, 400 mg, intravenous, q24h  insulin glargine, 25 Units, subcutaneous, Nightly  insulin lispro, 0-10 Units, subcutaneous, TID AC  levothyroxine, 75 mcg, oral,  Daily  metroNIDAZOLE, 500 mg, intravenous, q8h JAILENE  [Held by provider] valsartan, 80 mg, oral, Daily  [2] potassium chloride in 0.9%NaCl, 85 mL/hr, Last Rate: 85 mL/hr (08/08/25 0132)  [3] PRN medications: morphine, ondansetron

## 2025-08-08 NOTE — CARE PLAN
The patient's goals for the shift include      The clinical goals for the shift include Patient comfort

## 2025-08-08 NOTE — PROGRESS NOTES
Nika Alejandre is a 89 y.o. female on day 3 of admission presenting with Lower abdominal pain.    Subjective   Interval History: no fever, lower abdominal pain, no emesis        Review of Systems    Objective   Range of Vitals (last 24 hours)  Heart Rate:  [71-88]   Temp:  [36.3 °C (97.3 °F)-37 °C (98.6 °F)]   Resp:  [16-19]   BP: (104-182)/(49-88)   SpO2:  [95 %-98 %]        Daily Weight  08/05/25 : 68 kg (150 lb)    Body mass index is 27.44 kg/m².    Physical Exam  Constitutional:       Appearance: Normal appearance.   HENT:      Head: Normocephalic and atraumatic.      Mouth/Throat:      Mouth: Mucous membranes are moist.      Pharynx: Oropharynx is clear.     Eyes:      Pupils: Pupils are equal, round, and reactive to light.       Cardiovascular:      Rate and Rhythm: Normal rate and regular rhythm.      Heart sounds: Normal heart sounds.   Pulmonary:      Effort: Pulmonary effort is normal.      Breath sounds: Normal breath sounds.   Abdominal:      General: Abdomen is flat. Bowel sounds are normal.      Palpations: Abdomen is soft.      Tenderness: There is abdominal tenderness.     Musculoskeletal:      Cervical back: Normal range of motion.     Neurological:      Mental Status: She is alert.         Antibiotics  cefepime - 1 gram/50 mL  metroNIDAZOLE - 500 mg/100 mL    Relevant Results  Labs  Results from last 72 hours   Lab Units 08/07/25  0543 08/06/25  0540   WBC AUTO x10*3/uL 11.3 12.5*   HEMOGLOBIN g/dL 10.0* 10.1*   HEMATOCRIT % 31.4* 30.5*   PLATELETS AUTO x10*3/uL 273 226   NEUTROS PCT AUTO %  --  77.7   LYMPHS PCT AUTO %  --  11.4   MONOS PCT AUTO %  --  9.0   EOS PCT AUTO %  --  0.7     Results from last 72 hours   Lab Units 08/08/25  0548 08/07/25  0543 08/06/25  0540   SODIUM mmol/L 136 135* 131*   POTASSIUM mmol/L 3.1* 3.6 3.5   CHLORIDE mmol/L 105 106 100   CO2 mmol/L 21 21 23   BUN mg/dL 10 16 24*   CREATININE mg/dL 0.71 0.78 0.98   GLUCOSE mg/dL 74 129* 206*   CALCIUM mg/dL 8.1* 8.0* 8.2*    ANION GAP mmol/L 13 12 12   EGFR mL/min/1.73m*2 81 72 55*     Results from last 72 hours   Lab Units 08/07/25  0543   ALK PHOS U/L 42   BILIRUBIN TOTAL mg/dL 0.3   PROTEIN TOTAL g/dL 5.3*   ALT U/L 9   AST U/L 17   ALBUMIN g/dL 2.7*     Estimated Creatinine Clearance: 47.6 mL/min (by C-G formula based on SCr of 0.71 mg/dL).  C-Reactive Protein   Date Value Ref Range Status   08/07/2025 13.41 (H) <1.00 mg/dL Final   08/06/2025 15.70 (H) <1.00 mg/dL Final     Microbiology  Reviewed  Imaging  Reviewed        Assessment/Plan        Perforated appendicitis / abscess, for drain placement     Recommendations :  Continue Cefepime, Flagyl and Fluconazole  Discussed with the surgery team        I spent minutes in the professional and overall care of this patient.  The cultures and susceptibilities were reviewed and discussed with the micro lab, the antibiotics stewardship guidelines were reviewed, the infection control protocols and recommendations were reviewed with the patient and the staff            Martín Almanzar MD

## 2025-08-08 NOTE — PROGRESS NOTES
08/08/25 0945   Discharge Planning   Living Arrangements Alone  (2 sons live on either side)   Support Systems Children   Assistance Needed A&OX4; independent with ADLs with walker recently; doesn't drive; room air baseline and currently room air-denies CPAP; PCP Dr Peter Holt-denies current home care   Type of Residence Private residence   Number of Stairs to Enter Residence 3   Number of Stairs Within Residence 0   Do you have animals or pets at home? No   Home or Post Acute Services None   Expected Discharge Disposition Home   Stroke Family Assessment   Stroke Family Assessment Needed No

## 2025-08-09 LAB
ALBUMIN SERPL BCP-MCNC: 2.9 G/DL (ref 3.4–5)
ALP SERPL-CCNC: 63 U/L (ref 33–136)
ALT SERPL W P-5'-P-CCNC: 9 U/L (ref 7–45)
ANION GAP SERPL CALC-SCNC: 14 MMOL/L (ref 10–20)
AST SERPL W P-5'-P-CCNC: 12 U/L (ref 9–39)
BASOPHILS # BLD AUTO: 0.05 X10*3/UL (ref 0–0.1)
BASOPHILS NFR BLD AUTO: 0.4 %
BILIRUB SERPL-MCNC: 0.3 MG/DL (ref 0–1.2)
BUN SERPL-MCNC: 8 MG/DL (ref 6–23)
CALCIUM SERPL-MCNC: 7.8 MG/DL (ref 8.6–10.3)
CHLORIDE SERPL-SCNC: 103 MMOL/L (ref 98–107)
CO2 SERPL-SCNC: 20 MMOL/L (ref 21–32)
CREAT SERPL-MCNC: 0.68 MG/DL (ref 0.5–1.05)
CRP SERPL-MCNC: 11.12 MG/DL
EGFRCR SERPLBLD CKD-EPI 2021: 83 ML/MIN/1.73M*2
EOSINOPHIL # BLD AUTO: 0.15 X10*3/UL (ref 0–0.4)
EOSINOPHIL NFR BLD AUTO: 1.2 %
ERYTHROCYTE [DISTWIDTH] IN BLOOD BY AUTOMATED COUNT: 13.5 % (ref 11.5–14.5)
GLUCOSE BLD MANUAL STRIP-MCNC: 165 MG/DL (ref 74–99)
GLUCOSE BLD MANUAL STRIP-MCNC: 206 MG/DL (ref 74–99)
GLUCOSE BLD MANUAL STRIP-MCNC: 300 MG/DL (ref 74–99)
GLUCOSE BLD MANUAL STRIP-MCNC: 325 MG/DL (ref 74–99)
GLUCOSE BLD MANUAL STRIP-MCNC: 343 MG/DL (ref 74–99)
GLUCOSE SERPL-MCNC: 143 MG/DL (ref 74–99)
HCT VFR BLD AUTO: 31.1 % (ref 36–46)
HGB BLD-MCNC: 10.3 G/DL (ref 12–16)
IMM GRANULOCYTES # BLD AUTO: 0.48 X10*3/UL (ref 0–0.5)
IMM GRANULOCYTES NFR BLD AUTO: 3.7 % (ref 0–0.9)
LYMPHOCYTES # BLD AUTO: 1.83 X10*3/UL (ref 0.8–3)
LYMPHOCYTES NFR BLD AUTO: 14.3 %
MAGNESIUM SERPL-MCNC: 1.25 MG/DL (ref 1.6–2.4)
MCH RBC QN AUTO: 28.9 PG (ref 26–34)
MCHC RBC AUTO-ENTMCNC: 33.1 G/DL (ref 32–36)
MCV RBC AUTO: 87 FL (ref 80–100)
MONOCYTES # BLD AUTO: 0.96 X10*3/UL (ref 0.05–0.8)
MONOCYTES NFR BLD AUTO: 7.5 %
NEUTROPHILS # BLD AUTO: 9.35 X10*3/UL (ref 1.6–5.5)
NEUTROPHILS NFR BLD AUTO: 72.9 %
NRBC BLD-RTO: 0 /100 WBCS (ref 0–0)
PLATELET # BLD AUTO: 295 X10*3/UL (ref 150–450)
POTASSIUM SERPL-SCNC: 3 MMOL/L (ref 3.5–5.3)
PROT SERPL-MCNC: 5.4 G/DL (ref 6.4–8.2)
RBC # BLD AUTO: 3.56 X10*6/UL (ref 4–5.2)
SODIUM SERPL-SCNC: 134 MMOL/L (ref 136–145)
WBC # BLD AUTO: 12.8 X10*3/UL (ref 4.4–11.3)

## 2025-08-09 PROCEDURE — 85025 COMPLETE CBC W/AUTO DIFF WBC: CPT | Performed by: INTERNAL MEDICINE

## 2025-08-09 PROCEDURE — 2500000004 HC RX 250 GENERAL PHARMACY W/ HCPCS (ALT 636 FOR OP/ED): Performed by: INTERNAL MEDICINE

## 2025-08-09 PROCEDURE — 82947 ASSAY GLUCOSE BLOOD QUANT: CPT

## 2025-08-09 PROCEDURE — 2500000004 HC RX 250 GENERAL PHARMACY W/ HCPCS (ALT 636 FOR OP/ED): Performed by: NURSE PRACTITIONER

## 2025-08-09 PROCEDURE — 2500000001 HC RX 250 WO HCPCS SELF ADMINISTERED DRUGS (ALT 637 FOR MEDICARE OP): Performed by: INTERNAL MEDICINE

## 2025-08-09 PROCEDURE — 36415 COLL VENOUS BLD VENIPUNCTURE: CPT | Performed by: INTERNAL MEDICINE

## 2025-08-09 PROCEDURE — 97161 PT EVAL LOW COMPLEX 20 MIN: CPT | Mod: GP

## 2025-08-09 PROCEDURE — 2500000001 HC RX 250 WO HCPCS SELF ADMINISTERED DRUGS (ALT 637 FOR MEDICARE OP): Performed by: SURGERY

## 2025-08-09 PROCEDURE — 86140 C-REACTIVE PROTEIN: CPT | Performed by: INTERNAL MEDICINE

## 2025-08-09 PROCEDURE — 99232 SBSQ HOSP IP/OBS MODERATE 35: CPT | Performed by: SURGERY

## 2025-08-09 PROCEDURE — 99232 SBSQ HOSP IP/OBS MODERATE 35: CPT | Performed by: INTERNAL MEDICINE

## 2025-08-09 PROCEDURE — 83735 ASSAY OF MAGNESIUM: CPT | Performed by: INTERNAL MEDICINE

## 2025-08-09 PROCEDURE — 80053 COMPREHEN METABOLIC PANEL: CPT | Performed by: INTERNAL MEDICINE

## 2025-08-09 PROCEDURE — 97165 OT EVAL LOW COMPLEX 30 MIN: CPT | Mod: GO

## 2025-08-09 PROCEDURE — 1100000001 HC PRIVATE ROOM DAILY

## 2025-08-09 PROCEDURE — 2500000002 HC RX 250 W HCPCS SELF ADMINISTERED DRUGS (ALT 637 FOR MEDICARE OP, ALT 636 FOR OP/ED): Performed by: NURSE PRACTITIONER

## 2025-08-09 PROCEDURE — 2500000002 HC RX 250 W HCPCS SELF ADMINISTERED DRUGS (ALT 637 FOR MEDICARE OP, ALT 636 FOR OP/ED): Performed by: INTERNAL MEDICINE

## 2025-08-09 RX ORDER — OXYCODONE HYDROCHLORIDE 5 MG/1
5 TABLET ORAL EVERY 4 HOURS PRN
Refills: 0 | Status: ACTIVE | OUTPATIENT
Start: 2025-08-09

## 2025-08-09 RX ORDER — INSULIN LISPRO 100 [IU]/ML
0-10 INJECTION, SOLUTION INTRAVENOUS; SUBCUTANEOUS
Status: DISPENSED | OUTPATIENT
Start: 2025-08-09

## 2025-08-09 RX ORDER — OXYCODONE HYDROCHLORIDE 5 MG/1
2.5 TABLET ORAL EVERY 4 HOURS PRN
Refills: 0 | Status: DISPENSED | OUTPATIENT
Start: 2025-08-09

## 2025-08-09 RX ORDER — SODIUM CHLORIDE AND POTASSIUM CHLORIDE 150; 900 MG/100ML; MG/100ML
60 INJECTION, SOLUTION INTRAVENOUS CONTINUOUS
Status: DISCONTINUED | OUTPATIENT
Start: 2025-08-09 | End: 2025-08-10

## 2025-08-09 RX ORDER — POTASSIUM CHLORIDE 20 MEQ/1
20 TABLET, EXTENDED RELEASE ORAL ONCE
Status: COMPLETED | OUTPATIENT
Start: 2025-08-09 | End: 2025-08-09

## 2025-08-09 RX ORDER — PANTOPRAZOLE SODIUM 40 MG/1
40 TABLET, DELAYED RELEASE ORAL
Status: DISPENSED | OUTPATIENT
Start: 2025-08-10

## 2025-08-09 RX ORDER — POTASSIUM CHLORIDE 14.9 MG/ML
20 INJECTION INTRAVENOUS
Status: COMPLETED | OUTPATIENT
Start: 2025-08-09 | End: 2025-08-09

## 2025-08-09 RX ORDER — MAGNESIUM SULFATE HEPTAHYDRATE 40 MG/ML
2 INJECTION, SOLUTION INTRAVENOUS ONCE
Status: COMPLETED | OUTPATIENT
Start: 2025-08-09 | End: 2025-08-09

## 2025-08-09 RX ADMIN — INSULIN LISPRO 4 UNITS: 100 INJECTION, SOLUTION INTRAVENOUS; SUBCUTANEOUS at 17:08

## 2025-08-09 RX ADMIN — VALSARTAN 80 MG: 160 TABLET, FILM COATED ORAL at 09:34

## 2025-08-09 RX ADMIN — OXYCODONE HYDROCHLORIDE 2.5 MG: 5 TABLET ORAL at 20:26

## 2025-08-09 RX ADMIN — METRONIDAZOLE 500 MG: 500 SOLUTION INTRAVENOUS at 10:50

## 2025-08-09 RX ADMIN — LEVOTHYROXINE SODIUM 75 MCG: 0.07 TABLET ORAL at 05:50

## 2025-08-09 RX ADMIN — FLUCONAZOLE 400 MG: 2 INJECTION, SOLUTION INTRAVENOUS at 20:27

## 2025-08-09 RX ADMIN — CEFEPIME HYDROCHLORIDE 1 G: 1 INJECTION, SOLUTION INTRAVENOUS at 17:08

## 2025-08-09 RX ADMIN — INSULIN LISPRO 2 UNITS: 100 INJECTION, SOLUTION INTRAVENOUS; SUBCUTANEOUS at 09:32

## 2025-08-09 RX ADMIN — ENOXAPARIN SODIUM 40 MG: 100 INJECTION SUBCUTANEOUS at 09:33

## 2025-08-09 RX ADMIN — POTASSIUM CHLORIDE EXTENDED-RELEASE 40 MEQ: 1500 TABLET ORAL at 09:33

## 2025-08-09 RX ADMIN — INSULIN LISPRO 6 UNITS: 100 INJECTION, SOLUTION INTRAVENOUS; SUBCUTANEOUS at 20:41

## 2025-08-09 RX ADMIN — ONDANSETRON 4 MG: 2 INJECTION, SOLUTION INTRAMUSCULAR; INTRAVENOUS at 17:08

## 2025-08-09 RX ADMIN — SODIUM CHLORIDE AND POTASSIUM CHLORIDE 60 ML/HR: 9; 1.49 INJECTION, SOLUTION INTRAVENOUS at 20:41

## 2025-08-09 RX ADMIN — METRONIDAZOLE 500 MG: 500 SOLUTION INTRAVENOUS at 17:08

## 2025-08-09 RX ADMIN — CEFEPIME HYDROCHLORIDE 1 G: 1 INJECTION, SOLUTION INTRAVENOUS at 09:31

## 2025-08-09 RX ADMIN — CEFEPIME HYDROCHLORIDE 1 G: 1 INJECTION, SOLUTION INTRAVENOUS at 01:07

## 2025-08-09 RX ADMIN — MAGNESIUM SULFATE HEPTAHYDRATE 2 G: 40 INJECTION, SOLUTION INTRAVENOUS at 09:32

## 2025-08-09 RX ADMIN — CHOLECALCIFEROL (VITAMIN D3) 10 MCG (400 UNIT) TABLET 10 MCG: at 09:33

## 2025-08-09 RX ADMIN — POTASSIUM CHLORIDE 20 MEQ: 14.9 INJECTION, SOLUTION INTRAVENOUS at 12:21

## 2025-08-09 RX ADMIN — METRONIDAZOLE 500 MG: 500 SOLUTION INTRAVENOUS at 01:51

## 2025-08-09 RX ADMIN — POTASSIUM CHLORIDE 20 MEQ: 14.9 INJECTION, SOLUTION INTRAVENOUS at 09:32

## 2025-08-09 RX ADMIN — DEXTROSE, SODIUM CHLORIDE, AND POTASSIUM CHLORIDE 85 ML/HR: 5; .9; .15 INJECTION INTRAVENOUS at 05:53

## 2025-08-09 RX ADMIN — DEXTROSE, SODIUM CHLORIDE, AND POTASSIUM CHLORIDE 60 ML/HR: 5; .9; .15 INJECTION INTRAVENOUS at 20:27

## 2025-08-09 RX ADMIN — MORPHINE SULFATE 3 MG: 4 INJECTION INTRAVENOUS at 02:52

## 2025-08-09 RX ADMIN — INSULIN LISPRO 8 UNITS: 100 INJECTION, SOLUTION INTRAVENOUS; SUBCUTANEOUS at 11:59

## 2025-08-09 RX ADMIN — POTASSIUM CHLORIDE EXTENDED-RELEASE 20 MEQ: 1500 TABLET ORAL at 11:59

## 2025-08-09 RX ADMIN — ACETAMINOPHEN 650 MG: 325 TABLET ORAL at 05:50

## 2025-08-09 ASSESSMENT — PAIN - FUNCTIONAL ASSESSMENT
PAIN_FUNCTIONAL_ASSESSMENT: 0-10

## 2025-08-09 ASSESSMENT — PAIN SCALES - GENERAL
PAINLEVEL_OUTOF10: 6
PAINLEVEL_OUTOF10: 4
PAINLEVEL_OUTOF10: 0 - NO PAIN
PAINLEVEL_OUTOF10: 0 - NO PAIN
PAINLEVEL_OUTOF10: 2
PAINLEVEL_OUTOF10: 0 - NO PAIN
PAINLEVEL_OUTOF10: 0 - NO PAIN

## 2025-08-09 ASSESSMENT — COGNITIVE AND FUNCTIONAL STATUS - GENERAL
WALKING IN HOSPITAL ROOM: A LITTLE
HELP NEEDED FOR BATHING: A LITTLE
CLIMB 3 TO 5 STEPS WITH RAILING: A LITTLE
TOILETING: A LITTLE
WALKING IN HOSPITAL ROOM: A LITTLE
MOBILITY SCORE: 22
CLIMB 3 TO 5 STEPS WITH RAILING: A LITTLE
DAILY ACTIVITIY SCORE: 24
DRESSING REGULAR LOWER BODY CLOTHING: A LITTLE
DRESSING REGULAR UPPER BODY CLOTHING: A LITTLE
HELP NEEDED FOR BATHING: A LITTLE
DAILY ACTIVITIY SCORE: 22
DAILY ACTIVITIY SCORE: 19
PERSONAL GROOMING: A LITTLE
MOBILITY SCORE: 21
TOILETING: A LITTLE
STANDING UP FROM CHAIR USING ARMS: A LITTLE
MOBILITY SCORE: 24

## 2025-08-09 ASSESSMENT — PAIN DESCRIPTION - LOCATION
LOCATION: ABDOMEN
LOCATION: ABDOMEN

## 2025-08-09 ASSESSMENT — ACTIVITIES OF DAILY LIVING (ADL)
BATHING_ASSISTANCE: STAND BY
ADL_ASSISTANCE: INDEPENDENT
ADL_ASSISTANCE: INDEPENDENT

## 2025-08-09 ASSESSMENT — PAIN DESCRIPTION - ORIENTATION: ORIENTATION: LOWER

## 2025-08-09 NOTE — PROGRESS NOTES
Occupational Therapy    Evaluation    Patient Name: Nika Alejandre  MRN: [id number]  Department: [address]  Room: [address] [place], [place] [address]  Today's Date: 8/9/2025  Time Calculation  Start Time: 1106  Stop Time: 1119  Time Calculation (min): 13 min    Assessment  IP OT Assessment  OT Assessment: Pt presents at baseline with ADLs and functional mobility. Has adequate assistance at home if needed. No further skilled OT needs identified.  Prognosis: Good  Barriers to Discharge Home: No anticipated barriers  Evaluation/Treatment Tolerance: Patient tolerated treatment well  Medical Staff Made Aware: Yes  End of Session Communication: Bedside nurse  End of Session Patient Position: Up in chair, Alarm on  Plan:  No Skilled OT: No acute OT goals identified  OT Frequency: OT eval only  OT Discharge Recommendations: No further acute OT  OT Recommended Transfer Status: Stand by assist  OT - OK to Discharge: Yes (per OT POC)    Subjective   Current Problem:  1. Lower abdominal pain        2. Perforated appendicitis        3. Abscess        4. Hyponatremia          OT Visit Info:  OT Received On: 08/09/25  General Visit Info:  General  Reason for Referral: 89 yo female referred to OT for impaired ADLs/mobility  Referred By: Denia Blankenship MD  Past Medical History Relevant to Rehab: Past Medical History  Hypertension  CAD  DM, insulin requiring  Hyperlipidemia  Hypothyroid  Macular degeneration  GERD  Nephrolithiasis     Surgical History  Bilateral TKR  Bilateral cataract removal  Lap cholecystectomy   D&C  CABG, 4 vessel  Co-Treatment: PT  Co-Treatment Reason: maximize pt safety and outcomes  Prior to Session Communication: Bedside nurse  Patient Position Received: Bed, 3 rail up, Alarm on  General Comment: Pt pleasant, cooperative with OT evaluation  Precautions:  Medical Precautions: Fall precautions (IV)           Pain:  Pain Assessment  Pain Assessment: 0-10  0-10 (Numeric) Pain Score: 0 - No pain    Objective   Cognition:  Overall Cognitive  Status: Within Functional Limits  Arousal/Alertness: Appropriate responses to stimuli  Orientation Level: Oriented X4           Home Living:  Type of Home: House  Lives With: Alone (2 sons live next door, visit frequently)  Home Adaptive Equipment: None  Home Layout: One level  Home Access: Stairs to enter with rails  Entrance Stairs-Rails: Right  Entrance Stairs-Number of Steps: 3  Bathroom Shower/Tub: Walk-in shower  Bathroom Equipment: Grab bars in shower   Prior Function:  Level of [place]: Independent with ADLs and functional transfers, Independent with homemaking with ambulation  Receives Help From: Family  ADL Assistance: Independent  Homemaking Assistance: Independent (family assists PRN)  Ambulatory Assistance: Independent (no devices)     ADL:  Eating Assistance: Independent  Grooming Assistance: Independent  Bathing Assistance: Stand by  UE Dressing Assistance: Independent  LE Dressing Assistance: Independent  Toileting Assistance with Device: Stand by  Functional Assistance: Stand by  ADL Comments: Donned pants independently with forward flexion. Other ADLs anticipated.  Activity Tolerance:  Endurance: Tolerates 10 - 20 min exercise with multiple rests  Bed Mobility/Transfers: Bed Mobility  Bed Mobility: Yes  Bed Mobility 1  Bed Mobility 1: Supine to sitting  Level of Assistance 1: Modified independent  Bed Mobility Comments 1: HOB elevated    Transfers  Transfer: Yes  Transfer 1  Transfer From 1: Sit to  Transfer to 1: Stand  Technique 1: Sit to stand, Stand to sit  Transfer Level of Assistance 1: Modified independent      Functional Mobility:  Functional Mobility  Functional Mobility Performed: Yes  Functional Mobility 1  Surface 1: Level tile  Device 1: No device  Assistance 1: Close supervision  Comments 1: Ambulated around bed with good balance, quick pace, no physical assist needed  Sitting Balance:  Static Sitting Balance  Static Sitting-Balance Support: Feet supported  Static Sitting-Level  of Assistance: Independent  Standing Balance:  Static Standing Balance  Static Standing-Balance Support: No upper extremity supported  Static Standing-Level of Assistance: Distant supervision    Strength:  Strength Comments: BUE WFL     Coordination:  Movements are Fluid and Coordinated: Yes   Hand Function:  Hand Function  Gross Grasp: Functional  Coordination: Functional  Extremities: RUE   RUE : Within Functional Limits and LUE   LUE: Within Functional Limits    Outcome Measures: [facility] Daily Activity  Putting on and taking off regular lower body clothing: None  Bathing (including washing, rinsing, drying): A little  Putting on and taking off regular upper body clothing: None  Toileting, which includes using toilet, bedpan or urinal: A little  Taking care of personal grooming such as brushing teeth: None  Eating Meals: None  Daily Activity - Total Score: 22      Education Documentation  Body Mechanics, taught by Urbano Lira OT at 8/9/2025 11:44 AM.  Learner: Patient  Readiness: Acceptance  Method: Explanation  Response: Verbalizes Understanding    Precautions, taught by Urbano Lira OT at 8/9/2025 11:44 AM.  Learner: Patient  Readiness: Acceptance  Method: Explanation  Response: Verbalizes Understanding    ADL Training, taught by Urbano Lira OT at 8/9/2025 11:44 AM.  Learner: Patient  Readiness: Acceptance  Method: Explanation  Response: Verbalizes Understanding    Education Comments  No comments found.

## 2025-08-09 NOTE — CARE PLAN
Problem: Pain - Adult  Goal: Verbalizes/displays adequate comfort level or baseline comfort level  Outcome: Progressing     Problem: Safety - Adult  Goal: Free from fall injury  Outcome: Progressing     Problem: Discharge Planning  Goal: Discharge to home or other facility with appropriate resources  Outcome: Progressing     Problem: Chronic Conditions and Co-morbidities  Goal: Patient's chronic conditions and co-morbidity symptoms are monitored and maintained or improved  Outcome: Progressing     Problem: Nutrition  Goal: Nutrient intake appropriate for maintaining nutritional needs  Outcome: Progressing   The patient's goals for the shift include      The clinical goals for the shift include patient will have one meal in the chair    Goal met

## 2025-08-09 NOTE — PROGRESS NOTES
Physical Therapy    Physical Therapy Evaluation    Patient Name: Nika Alejandre  MRN: [id number]  Department: [address]  [facility]  Today's Date: 8/9/2025   Time Calculation  Start Time: 1107  Stop Time: 1119  Time Calculation (min): 12 min    Assessment/Plan   PT Assessment  Barriers to Discharge Home: No anticipated barriers (Safe home layout, good safety awareness noted, good family/ friend support system)  Evaluation/Treatment Tolerance: Patient tolerated treatment well  Medical Staff Made Aware: Yes  Strengths: Ability to acquire knowledge, Housing layout, Support of Caregivers, Support of extended family/friends  End of Session Communication: Bedside nurse  Assessment Comment: No further skilled PT needs indicated during acute LOS and upon discharge at this time.  Pt presents at safe, IND baseline LOF with mobility and ADL performance.  Pt denies concerns with current functional status and reports good support system and assist if needed from family and friends.  Pt did report concern about meals and recommended pt look into Meals on Wheels.  End of Session Patient Position: Up in chair, Alarm on  IP OR SWING BED PT PLAN  Inpatient or Swing Bed: Inpatient  PT Plan  PT Plan: PT Eval only  PT Eval Only Reason: At baseline function (No acute PT needs identified)  PT Frequency: PT eval only  PT Discharge Recommendations: No further acute PT, No PT needed after discharge  PT Recommended Transfer Status: Independent  PT - OK to Discharge: Yes (Per PT POC)    Subjective     PT Visit Info:  PT Received On: 08/09/25  General Visit Information:  General  Reason for Referral: 89 yo female referred to PT for impaired mobility.  Pt presents with lower abdominal pain.  Referred By: Denia Blankenship MD  Past Medical History Relevant to Rehab: Past Medical History  Hypertension  CAD  DM, insulin requiring  Hyperlipidemia  Hypothyroid  Macular degeneration  GERD  Nephrolithiasis     Surgical History  Bilateral TKR  Bilateral  cataract removal  Lap cholecystectomy   D&C  CABG, 4 vessel  Family/Caregiver Present: No  Co-Treatment: OT  Co-Treatment Reason: maximize pt safety and outcomes  Prior to Session Communication: Bedside nurse  Patient Position Received: Bed, 3 rail up, Alarm on  General Comment: Cleared per nursing.  Pt pleasant, cooperative with PT evaluation.  Home Living:  Home Living  Type of Home: House  Lives With: Alone (2 sons live next door as well as a friend, visit and check on her frequently)  Home Adaptive Equipment: None  Home Layout: One level  Home Access: Stairs to enter with rails  Entrance Stairs-Rails: Right  Entrance Stairs-Number of Steps: 3  Bathroom Shower/Tub: Walk-in shower  Bathroom Equipment: Grab bars in shower, Grab bars around toilet  Prior Level of Function:  Prior Function Per Pt/Caregiver Report  Level of [place]: Independent with ADLs and functional transfers, Independent with homemaking with ambulation  Receives Help From: Family, Friends  ADL Assistance: Independent  Homemaking Assistance: Independent (Family assists PRN, pt reports they provide meals at least 2 days/week.)  Ambulatory Assistance: Independent (No devices.)  Precautions:  Precautions  Medical Precautions: Fall precautions (IVs, Tele)        Objective   Pain:  Pain Assessment  Pain Assessment: 0-10  0-10 (Numeric) Pain Score: 0 - No pain  Cognition:  Cognition  Overall Cognitive Status: Within Functional Limits  Arousal/Alertness: Appropriate responses to stimuli  Orientation Level: Oriented X4    General Assessments:     Activity Tolerance  Endurance: Tolerates 10 - 20 min exercise with multiple rests     Strength  Strength Comments: BLE WFL  Coordination  Movements are Fluid and Coordinated: Yes  Functional Assessments:  Bed Mobility  Bed Mobility: Yes  Bed Mobility 1  Bed Mobility 1: Supine to sitting  Level of Assistance 1: Modified independent  Bed Mobility Comments 1: HOB elevated    Transfers  Transfer: Yes  Transfer  1  Transfer From 1: Sit to  Transfer to 1: Stand  Technique 1: Sit to stand, Stand to sit  Transfer Level of Assistance 1: Modified independent  Trials/Comments 1: Pt steady upon standing with stable EZEQUIEL.  No device needed; however, does occasionally stabilize self on furniture/ wall.    Ambulation/Gait Training  Ambulation/Gait Training Performed: Yes  Ambulation/Gait Training 1  Surface 1: Level tile  Device 1: No device  Assistance 1: Close supervision, Distant supervision  Quality of Gait 1: Decreased step length (Slower, tentative kmaran)  Comments/Distance (ft) 1: Pt ambulating within her room without AD, occasional support from wall/ furniture.  No loss of balance or unsteadiness observed with direction changes and navigating obstacles.  Therapist assisted with managing IV pole.    Stairs  Stairs: No (Pt denies concerns.)  Extremity/Trunk Assessments:  RUE   RUE : Within Functional Limits  LUE   LUE: Within Functional Limits  RLE   RLE : Within Functional Limits  LLE   LLE : Within Functional Limits  Outcome Measures:  [facility] Basic Mobility  Turning from your back to your side while in a flat bed without using bedrails: None  Moving from lying on your back to sitting on the side of a flat bed without using bedrails: None  Moving to and from bed to chair (including a wheelchair): None  Standing up from a chair using your arms (e.g. wheelchair or bedside chair): None  To walk in hospital room: None  Climbing 3-5 steps with railing: None  Basic Mobility - Total Score: 24        Education Documentation  Precautions, taught by Etelvina Bowers, PT at 8/9/2025 12:29 PM.  Learner: Patient  Readiness: Acceptance  Method: Explanation, Demonstration  Response: Verbalizes Understanding, Demonstrated Understanding    Body Mechanics, taught by Etelvina Bowers, PT at 8/9/2025 12:29 PM.  Learner: Patient  Readiness: Acceptance  Method: Explanation, Demonstration  Response: Verbalizes Understanding, Demonstrated  Understanding    Home Exercise Program, taught by Etelvina Bowers, PT at 8/9/2025 12:29 PM.  Learner: Patient  Readiness: Acceptance  Method: Explanation, Demonstration  Response: Verbalizes Understanding, Demonstrated Understanding    Mobility Training, taught by Etelvina Bowers, PT at 8/9/2025 12:29 PM.  Learner: Patient  Readiness: Acceptance  Method: Explanation, Demonstration  Response: Verbalizes Understanding, Demonstrated Understanding    Education Comments  No comments found.

## 2025-08-09 NOTE — PROGRESS NOTES
Patient: Nika Alejandre  Room/bed: 139/139-A  Admitted on: 8/5/2025    Age: 89 y.o.   Gender: female  Code Status:  No Order   Admitting Dx: Abscess [L02.91]  Hyponatremia [E87.1]  Lower abdominal pain [R10.30]  Perforated appendicitis [K35.32]    MRN: [id number]  PCP: Karson Holt MD       Subjective   Thinks she feels a little better. No nausea. Doesn't have any appetite, wants some coffee    Objective    Physical Exam  Constitutional:       Appearance: Normal appearance.   HENT:      Head: Normocephalic.      Ears:      Comments: Mildly [place]     Nose: Nose normal.      Mouth/Throat:      Mouth: Mucous membranes are dry.     Eyes:      Extraocular Movements: Extraocular movements intact.      Pupils: Pupils are equal, round, and reactive to light.     Neck:      Comments: No jvd  Cardiovascular:      Comments: Regular, 2/6 systolic murmur  Pulmonary:      Comments: Few inspiratory crackles, but posteriorly is clear. Good inspiratory effort  Abdominal:      Comments: Fairly soft.   Good bowel sounds  Continues to be tender, but seems to be less than previous  Drain in place, small amount of whitish gray milky fluid     Musculoskeletal:      Comments: No edema. Pulses equal     Skin:     General: Skin is warm and dry.     Neurological:      Mental Status: She is alert and oriented to person, place, and time. Mental status is at baseline.     Psychiatric:         Mood and Affect: Mood normal.         Behavior: Behavior normal.        Temp:  [35.9 °C (96.6 °F)-37 °C (98.6 °F)] 35.9 °C (96.6 °F)  Heart Rate:  [70-88] 76  Resp:  [15-19] 16  BP: (104-182)/(55-90) 133/58    Vitals:    08/05/25 1022   Weight: 68 kg (150 lb)           I/Os    Intake/Output Summary (Last 24 hours) at 8/9/2025 0830  Last data filed at 8/8/2025 1030  Gross per 24 hour   Intake 100 ml   Output --   Net 100 ml       Labs:   Results from last 72 hours   Lab Units 08/09/25  0610 08/08/25  0548 08/07/25  0543   SODIUM mmol/L 134* 136 135*  "  POTASSIUM mmol/L 3.0* 3.1* 3.6   CHLORIDE mmol/L 103 105 106   CO2 mmol/L 20* 21 21   BUN mg/dL 8 10 16   CREATININE mg/dL 0.68 0.71 0.78   GLUCOSE mg/dL 143* 74 129*   CALCIUM mg/dL 7.8* 8.1* 8.0*   ANION GAP mmol/L 14 13 12   EGFR mL/min/1.73m*2 83 81 72      Results from last 72 hours   Lab Units 08/09/25  0610 08/07/25  0543   WBC AUTO x10*3/uL 12.8* 11.3   HEMOGLOBIN g/dL 10.3* 10.0*   HEMATOCRIT % 31.1* 31.4*   PLATELETS AUTO x10*3/uL 295 273   NEUTROS PCT AUTO % 72.9  --    LYMPHS PCT AUTO % 14.3  --    MONOS PCT AUTO % 7.5  --    EOS PCT AUTO % 1.2  --       Lab Results   Component Value Date    CALCIUM 7.8 (L) 08/09/2025    PHOS 2.4 (L) 11/09/2018      Lab Results   Component Value Date    CRP 11.12 (H) 08/09/2025      [unfilled]     Micro/ID:   No results found for the last 90 days.                   No lab exists for component: \"AGALPCRNB\"   .ID  No results found for: \"URINECULTURE\", \"BLOODCULT\", \"CSFCULTSMEAR\"    Images:  US guided abscess fluid collection drainage  Narrative: Interpreted By:  Josue Fried and Mercado Amiel   STUDY:  US GUIDED ABSCESS FLUID COLLECTION DRAINAGE;  8/8/2025 3:42 pm      INDICATION:  Signs/Symptoms:pelvic abscess and pain.      COMPARISON:  CT ABDOMEN PELVIS W IV CONTRAST 8/7/2025, CT ABDOMEN PELVIS W IV  CONTRAST 8/5/2025      ACCESSION NUMBER(S):  [id number]      ORDERING CLINICIAN:  JOSUE FRIED      TECHNIQUE:  INTERVENTIONALIST(S):  MD Evans Dallas MD      CONSENT:  The patient/patient's POA/next of kin was informed of the nature of  the proposed procedure. The purposes, alternatives, risks, and  benefits were explained and discussed. All questions were answered  and consent was obtained.          SEDATION:  Moderate conscious IV sedation services (supervision of  administration, induction, and maintenance) were provided by the  physician performing the procedure with intravenous fentanyl 50 mcg  and versed 0.5 mg for 9 minutes. The physician was " assisted by an  independent trained observer, an interventional radiology nurse, in  the continuous monitoring of patient level of consciousness and  physiologic status.      MEDICATION/CONTRAST:  No additional      TIME OUT:  A time out was performed immediately prior to procedure start with  the interventional team, correctly identifying the patient name, date  of birth, MRN, procedure, anatomy (including marking of site and  side), patient position, procedure consent form, relevant laboratory  and imaging test results, antibiotic administration, safety  precautions, and procedure-specific equipment needs.      COMPLICATIONS:  No immediate adverse events identified.      FINDINGS:  Limited ultrasonographic images as well as axial CT images were  obtained through the right lower quadrant for the purposes of needle  guidance were taken. The images demonstrate heterogeneous  predominantly hypoechoic small fluid collection posterior to the  bladder as noted on the prior CT dated 08/07/2025.      Patient was placed in supine position and prepped in the usual  sterile manner. Lidocaine was used for local anesthesia. Utilizing  ultrasound guidance a 19G Yueh needle was used to access the fluid  collection located posterior to the bladder, purulent appearance  fluid were obtained. A 035 wire was then used to maintain access in  the collection after removal of the Yueh needle. Initial aspiration  revealed bloody and purulent fluid. A small nick was made along the  skin with 11 gauge scalpel blade, for which a 8 [place] pigtail  drainage catheter was subsequently placed in the collection. The  pigtail drain was formed, connected to a EVELYNE drain, and  sutured/secured in place. Stayfix fixation catheter was also placed  to further secure the catheter.      Postprocedure images demonstrated no evidence of hemorrhage.      Fluid sample was sent to the laboratory for further analysis.      The patient tolerated the procedure well  without any immediate  complications.      Impression: Uneventful ultrasound guided 8 French pigtail drainage catheter  placement for the right lower quadrant small fluid collection with  purulent fluid collection, as detailed above. Fluid sample was sent  to the laboratory for further analysis.      I was present for and/or performed the critical portions of the  procedure and immediately available throughout the entire procedure.      I personally reviewed the image(s)/study and interpretation. I agree  with the findings as stated.      Performed and dictated at [facility].      MACRO:  None      Signed by: Marisel Fried 8/8/2025 8:47 PM  Dictation workstation:   [id number]       Meds    Scheduled medications  Scheduled Medications[1]  Continuous medications  Continuous Medications[2]  PRN medications  PRN Medications[3]     Assessment and Plan    Niak Alejandre is a 89 y.o. female   Appendicitis, with perforation. Yesterday, had drain placed. Not a lot of fluid has been obtained, but gram stain is showing gram variable rods. Will follow up. Does seem a little more comfortable. Continue full liquid diet for now.   Hypomag--supplement oral and iv. Recheck  Hypokalemia. Supplement both oral and iv. Recheck  CAD, stable  DM. Monitor, iss as needed  GERD  Hx hypertension- pressure has risen , will resume meds and monitor  Therapy evals  Anemia, has dropped since admission, with fluids etc. Monitor  DVT prophylaxis      Denia Blankenship MD         [1] acetaminophen, 650 mg, oral, q6h JAILENE  cefepime, 1 g, intravenous, q8h JAILENE  [Held by provider] cholecalciferol, 10 mcg, oral, Daily  enoxaparin, 40 mg, subcutaneous, Daily  fluconazole, 400 mg, intravenous, q24h  [Held by provider] insulin glargine, 25 Units, subcutaneous, Nightly  insulin lispro, 0-10 Units, subcutaneous, TID AC  levothyroxine, 75 mcg, oral, Daily  magnesium sulfate, 2 g, intravenous, Once  metroNIDAZOLE, 500 mg,  intravenous, q8h JAILENE  pantoprazole, 40 mg, intravenous, BID  potassium chloride, 20 mEq, intravenous, q2h  potassium chloride CR, 20 mEq, oral, Once  potassium chloride CR, 40 mEq, oral, Once  [Held by provider] valsartan, 80 mg, oral, Daily  [2] potassium chloride-D5-0.9%NaCl, 85 mL/hr, Last Rate: 85 mL/hr (08/09/25 0553)  [3] PRN medications: morphine, ondansetron

## 2025-08-09 NOTE — PROGRESS NOTES
"GENERAL SURGERY PROGRESS NOTE    PATIENT NAME: Nika Alejandre  MRN: [id number]  DATE: 8/9/2025    SUMMARY OF CURRENT STATUS  - PPD # 1 s/p IR drain placement for pelvic abscess due to perforated appendicitis  - feeling much better, pain is controlled  - a bit of ileus, as she is drinking full liquids but gets full quickly  - CBC, BMP, Mag labs reviewed - Mag and K are low and being replaced  - denies any major abdominal pain, nausea, SOB, chest pain, leg pain    TODAY'S ASSESSMENT:  - New complications over the past 24 hours: No  - No concerns overnight  - Pain controlled: Yes  - DVT prophylaxis:  Yes - Lovenox and SCDs  - Diet is currently: Full liquids  - Nausea:  No  - Emesis:  No  - Pt has sat in the chair / ambulated    ON EXAMINATION:  /58 (BP Location: Right arm, Patient Position: Lying)   Pulse 76   Temp 35.9 °C (96.6 °F) (Temporal)   Resp 16   Ht 1.575 m (5' 2\")   Wt 68 kg (150 lb)   SpO2 95%   BMI 27.44 kg/m²   APPEARANCE: NAD, looks well.  ABDOMEN:  Soft, left sided and pelvic tenderness, nondistended  Drain: 10 ml purulent    INS/OUTS:    Intake/Output Summary (Last 24 hours) at 8/9/2025 1433  Last data filed at 8/9/2025 1224  Gross per 24 hour   Intake 941.42 ml   Output 510 ml   Net 431.42 ml         BLOOD WORK:  Results for orders placed or performed during the hospital encounter of 08/05/25 (from the past 24 hours)   POCT GLUCOSE   Result Value Ref Range    POCT Glucose 73 (L) 74 - 99 mg/dL   POCT GLUCOSE   Result Value Ref Range    POCT Glucose 108 (H) 74 - 99 mg/dL   Comprehensive metabolic panel   Result Value Ref Range    Glucose 143 (H) 74 - 99 mg/dL    Sodium 134 (L) 136 - 145 mmol/L    Potassium 3.0 (L) 3.5 - 5.3 mmol/L    Chloride 103 98 - 107 mmol/L    Bicarbonate 20 (L) 21 - 32 mmol/L    Anion Gap 14 10 - 20 mmol/L    Urea Nitrogen 8 6 - 23 mg/dL    Creatinine 0.68 0.50 - 1.05 mg/dL    eGFR 83 >60 mL/min/1.73m*2    Calcium 7.8 (L) 8.6 - 10.3 mg/dL    Albumin 2.9 (L) 3.4 - 5.0 g/dL "    Alkaline Phosphatase 63 33 - 136 U/L    Total Protein 5.4 (L) 6.4 - 8.2 g/dL    AST 12 9 - 39 U/L    Bilirubin, Total 0.3 0.0 - 1.2 mg/dL    ALT 9 7 - 45 U/L   CBC and Auto Differential   Result Value Ref Range    WBC 12.8 (H) 4.4 - 11.3 x10*3/uL    nRBC 0.0 0.0 - 0.0 /100 WBCs    RBC 3.56 (L) 4.00 - 5.20 x10*6/uL    Hemoglobin 10.3 (L) 12.0 - 16.0 g/dL    Hematocrit 31.1 (L) 36.0 - 46.0 %    MCV 87 80 - 100 fL    MCH 28.9 26.0 - 34.0 pg    MCHC 33.1 32.0 - 36.0 g/dL    RDW 13.5 11.5 - 14.5 %    Platelets 295 150 - 450 x10*3/uL    Neutrophils % 72.9 40.0 - 80.0 %    Immature Granulocytes %, Automated 3.7 (H) 0.0 - 0.9 %    Lymphocytes % 14.3 13.0 - 44.0 %    Monocytes % 7.5 2.0 - 10.0 %    Eosinophils % 1.2 0.0 - 6.0 %    Basophils % 0.4 0.0 - 2.0 %    Neutrophils Absolute 9.35 (H) 1.60 - 5.50 x10*3/uL    Immature Granulocytes Absolute, Automated 0.48 0.00 - 0.50 x10*3/uL    Lymphocytes Absolute 1.83 0.80 - 3.00 x10*3/uL    Monocytes Absolute 0.96 (H) 0.05 - 0.80 x10*3/uL    Eosinophils Absolute 0.15 0.00 - 0.40 x10*3/uL    Basophils Absolute 0.05 0.00 - 0.10 x10*3/uL   Magnesium   Result Value Ref Range    Magnesium 1.25 (L) 1.60 - 2.40 mg/dL   C-reactive protein   Result Value Ref Range    C-Reactive Protein 11.12 (H) <1.00 mg/dL   POCT GLUCOSE   Result Value Ref Range    POCT Glucose 165 (H) 74 - 99 mg/dL   POCT GLUCOSE   Result Value Ref Range    POCT Glucose 325 (H) 74 - 99 mg/dL   POCT GLUCOSE   Result Value Ref Range    POCT Glucose 343 (H) 74 - 99 mg/dL       IMPRESSION:  - Pt is doing well / as expected s/p IR drainage of pelvic abscess due to perforated appendicitis  - No new issues/concerns    PLAN PPD 1:  - full liquid diet  - continue abx regimen per ID, cultures pending  - Multimodal pain regimen: scheduled tylenol,  PRN oxycodone  - begin twice daily drain flushes  - Encourage Ambulation / use of incentive spirometry - PT consulted  - VTE prophylaxis - Continue with SCDs and Lovenox  - Disposition  - for delayed appendectomy    Servando Bauer MD  Bariatric and Minimally Invasive General Surgery

## 2025-08-09 NOTE — PROGRESS NOTES
Nika Alejandre is a 89 y.o. female on day 4 of admission presenting with Lower abdominal pain.    Subjective   Interval History: no fever, less pain        Review of Systems    Objective   Range of Vitals (last 24 hours)  Heart Rate:  [70-88]   Temp:  [35.9 °C (96.6 °F)-37 °C (98.6 °F)]   Resp:  [15-19]   BP: (104-182)/(55-90)   SpO2:  [95 %-98 %]        Daily Weight  08/05/25 : 68 kg (150 lb)    Body mass index is 27.44 kg/m².    Physical Exam  Constitutional:       Appearance: Normal appearance.   HENT:      Head: Normocephalic and atraumatic.      Mouth/Throat:      Mouth: Mucous membranes are moist.      Pharynx: Oropharynx is clear.     Eyes:      Pupils: Pupils are equal, round, and reactive to light.       Cardiovascular:      Rate and Rhythm: Normal rate and regular rhythm.      Heart sounds: Normal heart sounds.   Pulmonary:      Effort: Pulmonary effort is normal.      Breath sounds: Normal breath sounds.   Abdominal:      General: Abdomen is flat. Bowel sounds are normal.      Palpations: Abdomen is soft.      Tenderness: There is abdominal tenderness.      Comments: drain     Musculoskeletal:      Cervical back: Normal range of motion.     Neurological:      Mental Status: She is alert.         Antibiotics  cefepime - 1 gram/50 mL  metroNIDAZOLE - 500 mg/100 mL    Relevant Results  Labs  Results from last 72 hours   Lab Units 08/09/25  0610 08/07/25  0543   WBC AUTO x10*3/uL 12.8* 11.3   HEMOGLOBIN g/dL 10.3* 10.0*   HEMATOCRIT % 31.1* 31.4*   PLATELETS AUTO x10*3/uL 295 273   NEUTROS PCT AUTO % 72.9  --    LYMPHS PCT AUTO % 14.3  --    MONOS PCT AUTO % 7.5  --    EOS PCT AUTO % 1.2  --      Results from last 72 hours   Lab Units 08/09/25  0610 08/08/25  0548 08/07/25  0543   SODIUM mmol/L 134* 136 135*   POTASSIUM mmol/L 3.0* 3.1* 3.6   CHLORIDE mmol/L 103 105 106   CO2 mmol/L 20* 21 21   BUN mg/dL 8 10 16   CREATININE mg/dL 0.68 0.71 0.78   GLUCOSE mg/dL 143* 74 129*   CALCIUM mg/dL 7.8* 8.1* 8.0*    ANION GAP mmol/L 14 13 12   EGFR mL/min/1.73m*2 83 81 72     Results from last 72 hours   Lab Units 08/09/25  0610 08/07/25  0543   ALK PHOS U/L 63 42   BILIRUBIN TOTAL mg/dL 0.3 0.3   PROTEIN TOTAL g/dL 5.4* 5.3*   ALT U/L 9 9   AST U/L 12 17   ALBUMIN g/dL 2.9* 2.7*     Estimated Creatinine Clearance: 49.7 mL/min (by C-G formula based on SCr of 0.68 mg/dL).  C-Reactive Protein   Date Value Ref Range Status   08/09/2025 11.12 (H) <1.00 mg/dL Final   08/07/2025 13.41 (H) <1.00 mg/dL Final   08/06/2025 15.70 (H) <1.00 mg/dL Final     Microbiology  Reviewed  Imaging  Reviewed        Assessment/Plan        Perforated appendicitis / abscess, sp drain placement, the culture is pending     Recommendations :  Continue Cefepime, Flagyl and Fluconazole  Discussed with the medical team        I spent minutes in the professional and overall care of this patient.  The cultures and susceptibilities were reviewed and discussed with the micro lab, the antibiotics stewardship guidelines were reviewed, the infection control protocols and recommendations were reviewed with the patient and the staff            Martín Almanzar MD

## 2025-08-10 VITALS
RESPIRATION RATE: 17 BRPM | OXYGEN SATURATION: 94 % | SYSTOLIC BLOOD PRESSURE: 148 MMHG | BODY MASS INDEX: 27.6 KG/M2 | WEIGHT: 150 LBS | DIASTOLIC BLOOD PRESSURE: 66 MMHG | TEMPERATURE: 97.3 F | HEIGHT: 62 IN | HEART RATE: 74 BPM

## 2025-08-10 LAB
ANION GAP SERPL CALC-SCNC: 13 MMOL/L (ref 10–20)
BACTERIA FLD CULT: ABNORMAL
BUN SERPL-MCNC: 5 MG/DL (ref 6–23)
CALCIUM SERPL-MCNC: 7.9 MG/DL (ref 8.6–10.3)
CHLORIDE SERPL-SCNC: 105 MMOL/L (ref 98–107)
CO2 SERPL-SCNC: 21 MMOL/L (ref 21–32)
CREAT SERPL-MCNC: 0.69 MG/DL (ref 0.5–1.05)
EGFRCR SERPLBLD CKD-EPI 2021: 83 ML/MIN/1.73M*2
GLUCOSE BLD MANUAL STRIP-MCNC: 148 MG/DL (ref 74–99)
GLUCOSE BLD MANUAL STRIP-MCNC: 241 MG/DL (ref 74–99)
GLUCOSE BLD MANUAL STRIP-MCNC: 270 MG/DL (ref 74–99)
GLUCOSE BLD MANUAL STRIP-MCNC: 285 MG/DL (ref 74–99)
GLUCOSE SERPL-MCNC: 149 MG/DL (ref 74–99)
GRAM STN SPEC: ABNORMAL
GRAM STN SPEC: ABNORMAL
MAGNESIUM SERPL-MCNC: 1.54 MG/DL (ref 1.6–2.4)
POTASSIUM SERPL-SCNC: 3.4 MMOL/L (ref 3.5–5.3)
SODIUM SERPL-SCNC: 136 MMOL/L (ref 136–145)

## 2025-08-10 PROCEDURE — 2500000002 HC RX 250 W HCPCS SELF ADMINISTERED DRUGS (ALT 637 FOR MEDICARE OP, ALT 636 FOR OP/ED): Performed by: INTERNAL MEDICINE

## 2025-08-10 PROCEDURE — 2500000004 HC RX 250 GENERAL PHARMACY W/ HCPCS (ALT 636 FOR OP/ED): Performed by: INTERNAL MEDICINE

## 2025-08-10 PROCEDURE — 83735 ASSAY OF MAGNESIUM: CPT | Performed by: INTERNAL MEDICINE

## 2025-08-10 PROCEDURE — 99231 SBSQ HOSP IP/OBS SF/LOW 25: CPT | Performed by: SURGERY

## 2025-08-10 PROCEDURE — 36415 COLL VENOUS BLD VENIPUNCTURE: CPT | Performed by: INTERNAL MEDICINE

## 2025-08-10 PROCEDURE — 2500000001 HC RX 250 WO HCPCS SELF ADMINISTERED DRUGS (ALT 637 FOR MEDICARE OP): Performed by: INTERNAL MEDICINE

## 2025-08-10 PROCEDURE — 82374 ASSAY BLOOD CARBON DIOXIDE: CPT | Performed by: INTERNAL MEDICINE

## 2025-08-10 PROCEDURE — 99232 SBSQ HOSP IP/OBS MODERATE 35: CPT | Performed by: INTERNAL MEDICINE

## 2025-08-10 PROCEDURE — 82947 ASSAY GLUCOSE BLOOD QUANT: CPT

## 2025-08-10 PROCEDURE — 1100000001 HC PRIVATE ROOM DAILY

## 2025-08-10 PROCEDURE — 2500000002 HC RX 250 W HCPCS SELF ADMINISTERED DRUGS (ALT 637 FOR MEDICARE OP, ALT 636 FOR OP/ED): Performed by: NURSE PRACTITIONER

## 2025-08-10 RX ORDER — POTASSIUM CHLORIDE 20 MEQ/1
40 TABLET, EXTENDED RELEASE ORAL ONCE
Status: COMPLETED | OUTPATIENT
Start: 2025-08-10 | End: 2025-08-10

## 2025-08-10 RX ORDER — INSULIN GLARGINE-YFGN 100 [IU]/ML
18 INJECTION, SOLUTION SUBCUTANEOUS NIGHTLY
Status: DISPENSED | OUTPATIENT
Start: 2025-08-10

## 2025-08-10 RX ORDER — MAGNESIUM SULFATE 1 G/100ML
1 INJECTION INTRAVENOUS ONCE
Status: COMPLETED | OUTPATIENT
Start: 2025-08-10 | End: 2025-08-10

## 2025-08-10 RX ORDER — INSULIN GLARGINE 100 [IU]/ML
18 INJECTION, SOLUTION SUBCUTANEOUS NIGHTLY
Status: DISCONTINUED | OUTPATIENT
Start: 2025-08-10 | End: 2025-08-10

## 2025-08-10 RX ORDER — ACETAMINOPHEN 325 MG/1
975 TABLET ORAL 3 TIMES DAILY
Status: DISPENSED | OUTPATIENT
Start: 2025-08-10

## 2025-08-10 RX ADMIN — METRONIDAZOLE 500 MG: 500 SOLUTION INTRAVENOUS at 02:15

## 2025-08-10 RX ADMIN — INSULIN LISPRO 6 UNITS: 100 INJECTION, SOLUTION INTRAVENOUS; SUBCUTANEOUS at 21:23

## 2025-08-10 RX ADMIN — Medication 1 CAPSULE: at 10:51

## 2025-08-10 RX ADMIN — CEFEPIME HYDROCHLORIDE 1 G: 1 INJECTION, SOLUTION INTRAVENOUS at 16:39

## 2025-08-10 RX ADMIN — METRONIDAZOLE 500 MG: 500 SOLUTION INTRAVENOUS at 10:51

## 2025-08-10 RX ADMIN — ACETAMINOPHEN 975 MG: 325 TABLET ORAL at 10:51

## 2025-08-10 RX ADMIN — METRONIDAZOLE 500 MG: 500 SOLUTION INTRAVENOUS at 17:19

## 2025-08-10 RX ADMIN — CEFEPIME HYDROCHLORIDE 1 G: 1 INJECTION, SOLUTION INTRAVENOUS at 01:00

## 2025-08-10 RX ADMIN — ACETAMINOPHEN 650 MG: 325 TABLET ORAL at 06:26

## 2025-08-10 RX ADMIN — ENOXAPARIN SODIUM 40 MG: 100 INJECTION SUBCUTANEOUS at 08:30

## 2025-08-10 RX ADMIN — FLUCONAZOLE 400 MG: 2 INJECTION, SOLUTION INTRAVENOUS at 21:25

## 2025-08-10 RX ADMIN — INSULIN LISPRO 6 UNITS: 100 INJECTION, SOLUTION INTRAVENOUS; SUBCUTANEOUS at 12:13

## 2025-08-10 RX ADMIN — MAGNESIUM SULFATE IN DEXTROSE 1 G: 10 INJECTION, SOLUTION INTRAVENOUS at 10:51

## 2025-08-10 RX ADMIN — LEVOTHYROXINE SODIUM 75 MCG: 0.07 TABLET ORAL at 06:26

## 2025-08-10 RX ADMIN — INSULIN GLARGINE-YFGN 18 UNITS: 100 INJECTION, SOLUTION SUBCUTANEOUS at 21:23

## 2025-08-10 RX ADMIN — VALSARTAN 80 MG: 160 TABLET, FILM COATED ORAL at 08:30

## 2025-08-10 RX ADMIN — POTASSIUM CHLORIDE EXTENDED-RELEASE 40 MEQ: 1500 TABLET ORAL at 10:51

## 2025-08-10 RX ADMIN — INSULIN LISPRO 4 UNITS: 100 INJECTION, SOLUTION INTRAVENOUS; SUBCUTANEOUS at 16:38

## 2025-08-10 RX ADMIN — ACETAMINOPHEN 975 MG: 325 TABLET ORAL at 21:25

## 2025-08-10 RX ADMIN — PANTOPRAZOLE SODIUM 40 MG: 40 TABLET, DELAYED RELEASE ORAL at 06:26

## 2025-08-10 RX ADMIN — CEFEPIME HYDROCHLORIDE 1 G: 1 INJECTION, SOLUTION INTRAVENOUS at 08:36

## 2025-08-10 RX ADMIN — CHOLECALCIFEROL (VITAMIN D3) 10 MCG (400 UNIT) TABLET 10 MCG: at 08:30

## 2025-08-10 ASSESSMENT — COGNITIVE AND FUNCTIONAL STATUS - GENERAL
MOVING FROM LYING ON BACK TO SITTING ON SIDE OF FLAT BED WITH BEDRAILS: A LITTLE
PERSONAL GROOMING: A LITTLE
MOBILITY SCORE: 20
DAILY ACTIVITIY SCORE: 19
HELP NEEDED FOR BATHING: A LITTLE
TOILETING: A LITTLE
DRESSING REGULAR LOWER BODY CLOTHING: A LITTLE
DRESSING REGULAR UPPER BODY CLOTHING: A LITTLE
WALKING IN HOSPITAL ROOM: A LITTLE
DAILY ACTIVITIY SCORE: 24
STANDING UP FROM CHAIR USING ARMS: A LITTLE
CLIMB 3 TO 5 STEPS WITH RAILING: A LITTLE
MOBILITY SCORE: 24

## 2025-08-10 ASSESSMENT — PAIN SCALES - GENERAL
PAINLEVEL_OUTOF10: 3
PAINLEVEL_OUTOF10: 0 - NO PAIN
PAINLEVEL_OUTOF10: 3

## 2025-08-10 ASSESSMENT — PAIN - FUNCTIONAL ASSESSMENT
PAIN_FUNCTIONAL_ASSESSMENT: 0-10

## 2025-08-10 NOTE — NURSING NOTE
1930: assumed pt care    2025: let Jennifer Dodge NP know pt's blood sugar 300 and has no SS for night coverage

## 2025-08-10 NOTE — CARE PLAN
The patient's goals for the shift include  to sleep.     The clinical goals for the shift include pt will be able to sleep    Over the shift, the patient did make progress toward the following goals. Barriers to progression include pt is weak. Recommendations to address these barriers include to get OOB.

## 2025-08-10 NOTE — PROGRESS NOTES
Patient: Nika Alejandre  Room/bed: 139/139-A  Admitted on: 8/5/2025    Age: 89 y.o.   Gender: female  Code Status:  No Order   Admitting Dx: Abscess [L02.91]  Hyponatremia [E87.1]  Lower abdominal pain [R10.30]  Perforated appendicitis [K35.32]    MRN: [id number]  PCP: Karson Holt MD       Subjective   Says maybe she is 'a little better' did walk and this felt good. Not able to eat much, gets full very quickly. Pain continues    Objective    Physical Exam  Constitutional:       Appearance: Normal appearance.   HENT:      Head: Normocephalic.      Ears:      Comments: Slightly [place]     Nose: Nose normal.      Mouth/Throat:      Mouth: Mucous membranes are dry.     Eyes:      Extraocular Movements: Extraocular movements intact.      Pupils: Pupils are equal, round, and reactive to light.       Cardiovascular:      Rate and Rhythm: Normal rate and regular rhythm.      Pulses: Normal pulses.   Pulmonary:      Effort: Pulmonary effort is normal.      Breath sounds: Normal breath sounds.   Abdominal:      Comments: Abdomen remains distended.   Occasional bowel sounds  Drain intact--liquid today is not milky, it is more serous, slightly blood tinged  She continues to be very tender across lower abdomen     Musculoskeletal:      Comments: No edema. Pulses equal     Skin:     General: Skin is warm and dry.     Neurological:      General: No focal deficit present.      Mental Status: She is alert and oriented to person, place, and time.     Psychiatric:         Mood and Affect: Mood normal.         Behavior: Behavior normal.        Temp:  [35.9 °C (96.6 °F)-37.1 °C (98.8 °F)] 36.6 °C (97.9 °F)  Heart Rate:  [66-76] 73  Resp:  [17-19] 18  BP: (139-176)/(63-73) 156/67    Vitals:    08/05/25 1022   Weight: 68 kg (150 lb)           I/Os    Intake/Output Summary (Last 24 hours) at 8/10/2025 0920  Last data filed at 8/10/2025 0911  Gross per 24 hour   Intake 2977.42 ml   Output 928 ml   Net 2049.42 ml       Labs:   Results from  "last 72 hours   Lab Units 08/10/25  0540 08/09/25  0610 08/08/25  0548   SODIUM mmol/L 136 134* 136   POTASSIUM mmol/L 3.4* 3.0* 3.1*   CHLORIDE mmol/L 105 103 105   CO2 mmol/L 21 20* 21   BUN mg/dL 5* 8 10   CREATININE mg/dL 0.69 0.68 0.71   GLUCOSE mg/dL 149* 143* 74   CALCIUM mg/dL 7.9* 7.8* 8.1*   ANION GAP mmol/L 13 14 13   EGFR mL/min/1.73m*2 83 83 81      Results from last 72 hours   Lab Units 08/09/25  0610   WBC AUTO x10*3/uL 12.8*   HEMOGLOBIN g/dL 10.3*   HEMATOCRIT % 31.1*   PLATELETS AUTO x10*3/uL 295   NEUTROS PCT AUTO % 72.9   LYMPHS PCT AUTO % 14.3   MONOS PCT AUTO % 7.5   EOS PCT AUTO % 1.2      Lab Results   Component Value Date    CALCIUM 7.9 (L) 08/10/2025    PHOS 2.4 (L) 11/09/2018      Lab Results   Component Value Date    CRP 11.12 (H) 08/09/2025      [unfilled]     Micro/ID:   No results found for the last 90 days.                   No lab exists for component: \"AGALPCRNB\"   .ID  No results found for: \"URINECULTURE\", \"BLOODCULT\", \"CSFCULTSMEAR\"    Images:  US guided abscess fluid collection drainage  Narrative: Interpreted By:  Josue Fried,  Di Krueger   STUDY:  US GUIDED ABSCESS FLUID COLLECTION DRAINAGE;  8/8/2025 3:42 pm      INDICATION:  Signs/Symptoms:pelvic abscess and pain.      COMPARISON:  CT ABDOMEN PELVIS W IV CONTRAST 8/7/2025, CT ABDOMEN PELVIS W IV  CONTRAST 8/5/2025      ACCESSION NUMBER(S):  [id number]      ORDERING CLINICIAN:  JOSUE FRIED      TECHNIQUE:  INTERVENTIONALIST(S):  MD Evans Dallas MD      CONSENT:  The patient/patient's POA/next of kin was informed of the nature of  the proposed procedure. The purposes, alternatives, risks, and  benefits were explained and discussed. All questions were answered  and consent was obtained.          SEDATION:  Moderate conscious IV sedation services (supervision of  administration, induction, and maintenance) were provided by the  physician performing the procedure with intravenous fentanyl 50 mcg  and " versed 0.5 mg for 9 minutes. The physician was assisted by an  independent trained observer, an interventional radiology nurse, in  the continuous monitoring of patient level of consciousness and  physiologic status.      MEDICATION/CONTRAST:  No additional      TIME OUT:  A time out was performed immediately prior to procedure start with  the interventional team, correctly identifying the patient name, date  of birth, MRN, procedure, anatomy (including marking of site and  side), patient position, procedure consent form, relevant laboratory  and imaging test results, antibiotic administration, safety  precautions, and procedure-specific equipment needs.      COMPLICATIONS:  No immediate adverse events identified.      FINDINGS:  Limited ultrasonographic images as well as axial CT images were  obtained through the right lower quadrant for the purposes of needle  guidance were taken. The images demonstrate heterogeneous  predominantly hypoechoic small fluid collection posterior to the  bladder as noted on the prior CT dated 08/07/2025.      Patient was placed in supine position and prepped in the usual  sterile manner. Lidocaine was used for local anesthesia. Utilizing  ultrasound guidance a 19G Yueh needle was used to access the fluid  collection located posterior to the bladder, purulent appearance  fluid were obtained. A 035 wire was then used to maintain access in  the collection after removal of the Yueh needle. Initial aspiration  revealed bloody and purulent fluid. A small nick was made along the  skin with 11 gauge scalpel blade, for which a 8 Albanian pigtail  drainage catheter was subsequently placed in the collection. The  pigtail drain was formed, connected to a EVELYNE drain, and  sutured/secured in place. Stayfix fixation catheter was also placed  to further secure the catheter.      Postprocedure images demonstrated no evidence of hemorrhage.      Fluid sample was sent to the laboratory for further  analysis.      The patient tolerated the procedure well without any immediate  complications.      Impression: Uneventful ultrasound guided 8 Persian pigtail drainage catheter  placement for the right lower quadrant small fluid collection with  purulent fluid collection, as detailed above. Fluid sample was sent  to the laboratory for further analysis.      I was present for and/or performed the critical portions of the  procedure and immediately available throughout the entire procedure.      I personally reviewed the image(s)/study and interpretation. I agree  with the findings as stated.      Performed and dictated at [facility].      MACRO:  None      Signed by: Marisel Fried 8/8/2025 8:47 PM  Dictation workstation:   [id number]    Scheduled medications  Scheduled Medications[1]  Continuous medications  Continuous Medications[2]  PRN medications  PRN Medications[3]     Assessment and Plan    Nika Alejandre is a 89 y.o. female   Perforated appendix, with abscess formation. Drain placed on Friday. Appearance of drainage has changed, but smaller amounts, about 28 ml documented. Continue cefepime, pending culture which is growing gram variable rods. Afebrile. Will advance diet, but appetite is poor.   Hypomag/hypokalemia. Continue to supplement and monitor. Advance diet, see if this helps with appetite any  DM. Fluids will be stopped , resume lantus  CAD. , stable  Continue to increase ambulation, use IS etc    Denia Blankenship MD         [1] acetaminophen, 975 mg, oral, TID  cefepime, 1 g, intravenous, q8h JAILENE  cholecalciferol, 10 mcg, oral, Daily  enoxaparin, 40 mg, subcutaneous, Daily  fluconazole, 400 mg, intravenous, q24h  insulin glargine, 18 Units, subcutaneous, Nightly  insulin lispro, 0-10 Units, subcutaneous, With meals & nightly  lactobacillus acidophilus, 1 capsule, oral, Daily  levothyroxine, 75 mcg, oral, Daily  magnesium sulfate, 1 g, intravenous,  Once  metroNIDAZOLE, 500 mg, intravenous, q8h JAILENE  pantoprazole, 40 mg, oral, Daily before breakfast  potassium chloride CR, 40 mEq, oral, Once  valsartan, 80 mg, oral, Daily  [2]    [3] PRN medications: ondansetron, oxyCODONE, oxyCODONE

## 2025-08-10 NOTE — PROGRESS NOTES
"GENERAL SURGERY PROGRESS NOTE    PATIENT NAME: Nika Alejandre  MRN: [id number]  DATE: 8/10/2025    SUMMARY OF CURRENT STATUS  - PPD # 2 s/p IR drain placement for pelvic abscess due to perforated appendicitis  - feeling much better, pain is controlled  - tolerating soft diet without nausea, bloating or pain  - CBC, BMP, Mag labs reviewed - Mag and K remain low and being replaced  - drain out put low  - denies any major abdominal pain, nausea, SOB, chest pain, leg pain    TODAY'S ASSESSMENT:  - New complications over the past 24 hours: No  - No concerns overnight  - Pain controlled: Yes  - DVT prophylaxis:  Yes - Lovenox and SCDs  - Diet is currently: regular  - Nausea:  No  - Emesis:  No  - Pt has sat in the chair / ambulated    ON EXAMINATION:  /67 (BP Location: Right arm, Patient Position: Lying)   Pulse 73   Temp 36.6 °C (97.9 °F) (Temporal)   Resp 18   Ht 1.575 m (5' 2\")   Wt 68 kg (150 lb)   SpO2 96%   BMI 27.44 kg/m²   APPEARANCE: NAD, looks well.  ABDOMEN:  Soft, tenderness at drain sight, nondistended  Drain: minimal serosang    INS/OUTS:    Intake/Output Summary (Last 24 hours) at 8/10/2025 1508  Last data filed at 8/10/2025 1213  Gross per 24 hour   Intake 2530 ml   Output 418 ml   Net 2112 ml         BLOOD WORK:  Results for orders placed or performed during the hospital encounter of 08/05/25 (from the past 24 hours)   POCT GLUCOSE   Result Value Ref Range    POCT Glucose 206 (H) 74 - 99 mg/dL   POCT GLUCOSE   Result Value Ref Range    POCT Glucose 300 (H) 74 - 99 mg/dL   Basic metabolic panel   Result Value Ref Range    Glucose 149 (H) 74 - 99 mg/dL    Sodium 136 136 - 145 mmol/L    Potassium 3.4 (L) 3.5 - 5.3 mmol/L    Chloride 105 98 - 107 mmol/L    Bicarbonate 21 21 - 32 mmol/L    Anion Gap 13 10 - 20 mmol/L    Urea Nitrogen 5 (L) 6 - 23 mg/dL    Creatinine 0.69 0.50 - 1.05 mg/dL    eGFR 83 >60 mL/min/1.73m*2    Calcium 7.9 (L) 8.6 - 10.3 mg/dL   Magnesium   Result Value Ref Range    " Magnesium 1.54 (L) 1.60 - 2.40 mg/dL   POCT GLUCOSE   Result Value Ref Range    POCT Glucose 148 (H) 74 - 99 mg/dL   POCT GLUCOSE   Result Value Ref Range    POCT Glucose 285 (H) 74 - 99 mg/dL       IMPRESSION:  - Pt is doing well / as expected s/p IR drainage of pelvic abscess due to perforated appendicitis  - No new issues/concerns    PLAN PPD 2:  - regular diet  - continue abx regimen per ID, cultures pending  - Multimodal pain regimen: scheduled tylenol,  PRN oxycodone  - twice daily drain flushes  - Encourage Ambulation / use of incentive spirometry - PT consulted  - VTE prophylaxis - Continue with SCDs and Lovenox  - Disposition - ok for DC home from surgical perspective and follow up with Dr Tobias for interval appendectomy    Servando Bauer MD  Bariatric and Minimally Invasive General Surgery

## 2025-08-10 NOTE — CARE PLAN
Problem: Pain - Adult  Goal: Verbalizes/displays adequate comfort level or baseline comfort level  Outcome: Progressing     Problem: Safety - Adult  Goal: Free from fall injury  Outcome: Progressing     Problem: Discharge Planning  Goal: Discharge to home or other facility with appropriate resources  Outcome: Progressing     Problem: Chronic Conditions and Co-morbidities  Goal: Patient's chronic conditions and co-morbidity symptoms are monitored and maintained or improved  Outcome: Progressing     Problem: Nutrition  Goal: Nutrient intake appropriate for maintaining nutritional needs  Outcome: Progressing   The patient's goals for the shift include      The clinical goals for the shift include patient will ambulate three times today    Goal met

## 2025-08-10 NOTE — PROGRESS NOTES
Nika Alejandre is a 89 y.o. female on day 5 of admission presenting with Lower abdominal pain.    Subjective   Interval History: no fever, no new complaints, eating breakfast        Review of Systems    Objective   Range of Vitals (last 24 hours)  Heart Rate:  [66-76]   Temp:  [35.9 °C (96.6 °F)-37.1 °C (98.8 °F)]   Resp:  [17-19]   BP: (139-176)/(63-73)   SpO2:  [94 %-97 %]        Daily Weight  08/05/25 : 68 kg (150 lb)    Body mass index is 27.44 kg/m².    Physical Exam  Constitutional:       Appearance: Normal appearance.   HENT:      Head: Normocephalic and atraumatic.      Mouth/Throat:      Mouth: Mucous membranes are moist.      Pharynx: Oropharynx is clear.     Eyes:      Pupils: Pupils are equal, round, and reactive to light.       Cardiovascular:      Rate and Rhythm: Normal rate and regular rhythm.      Heart sounds: Normal heart sounds.   Pulmonary:      Effort: Pulmonary effort is normal.      Breath sounds: Normal breath sounds.   Abdominal:      General: Abdomen is flat. Bowel sounds are normal.      Palpations: Abdomen is soft.      Tenderness: There is abdominal tenderness.      Comments: drain     Musculoskeletal:      Cervical back: Normal range of motion.     Neurological:      Mental Status: She is alert.         Antibiotics  cefepime - 1 gram/50 mL  metroNIDAZOLE - 500 mg/100 mL    Relevant Results  Labs  Results from last 72 hours   Lab Units 08/09/25  0610   WBC AUTO x10*3/uL 12.8*   HEMOGLOBIN g/dL 10.3*   HEMATOCRIT % 31.1*   PLATELETS AUTO x10*3/uL 295   NEUTROS PCT AUTO % 72.9   LYMPHS PCT AUTO % 14.3   MONOS PCT AUTO % 7.5   EOS PCT AUTO % 1.2     Results from last 72 hours   Lab Units 08/10/25  0540 08/09/25  0610 08/08/25  0548   SODIUM mmol/L 136 134* 136   POTASSIUM mmol/L 3.4* 3.0* 3.1*   CHLORIDE mmol/L 105 103 105   CO2 mmol/L 21 20* 21   BUN mg/dL 5* 8 10   CREATININE mg/dL 0.69 0.68 0.71   GLUCOSE mg/dL 149* 143* 74   CALCIUM mg/dL 7.9* 7.8* 8.1*   ANION GAP mmol/L 13 14 13   EGFR  mL/min/1.73m*2 83 83 81     Results from last 72 hours   Lab Units 08/09/25  0610   ALK PHOS U/L 63   BILIRUBIN TOTAL mg/dL 0.3   PROTEIN TOTAL g/dL 5.4*   ALT U/L 9   AST U/L 12   ALBUMIN g/dL 2.9*     Estimated Creatinine Clearance: 49 mL/min (by C-G formula based on SCr of 0.69 mg/dL).  C-Reactive Protein   Date Value Ref Range Status   08/09/2025 11.12 (H) <1.00 mg/dL Final   08/07/2025 13.41 (H) <1.00 mg/dL Final   08/06/2025 15.70 (H) <1.00 mg/dL Final     Microbiology  Reviewed  Imaging  Reviewed        Assessment/Plan        Perforated appendicitis / abscess, sp drain placement, the culture is pending     Recommendations :  Continue Cefepime, Flagyl and Fluconazole  Increase the activity  Discussed with the medical team        I spent minutes in the professional and overall care of this patient.  The cultures and susceptibilities were reviewed and discussed with the micro lab, the antibiotics stewardship guidelines were reviewed, the infection control protocols and recommendations were reviewed with the patient and the staff            Martín Almanzar MD

## 2025-08-10 NOTE — SIGNIFICANT EVENT
. No insulin coverage.    Changed insulin to include HS and changed fluids and took out dextrose.

## 2025-08-11 ENCOUNTER — APPOINTMENT (OUTPATIENT)
Dept: RADIOLOGY | Facility: HOSPITAL | Age: OVER 89
DRG: 372 | End: 2025-08-11
Payer: MEDICARE

## 2025-08-11 LAB
ALBUMIN SERPL BCP-MCNC: 2.9 G/DL (ref 3.4–5)
ALP SERPL-CCNC: 53 U/L (ref 33–136)
ALT SERPL W P-5'-P-CCNC: 8 U/L (ref 7–45)
ANION GAP SERPL CALC-SCNC: 13 MMOL/L (ref 10–20)
AST SERPL W P-5'-P-CCNC: 13 U/L (ref 9–39)
BILIRUB SERPL-MCNC: 0.3 MG/DL (ref 0–1.2)
BUN SERPL-MCNC: 8 MG/DL (ref 6–23)
CALCIUM SERPL-MCNC: 8.3 MG/DL (ref 8.6–10.3)
CHLORIDE SERPL-SCNC: 105 MMOL/L (ref 98–107)
CO2 SERPL-SCNC: 24 MMOL/L (ref 21–32)
CREAT SERPL-MCNC: 0.69 MG/DL (ref 0.5–1.05)
CRP SERPL-MCNC: 6.48 MG/DL
EGFRCR SERPLBLD CKD-EPI 2021: 83 ML/MIN/1.73M*2
ERYTHROCYTE [DISTWIDTH] IN BLOOD BY AUTOMATED COUNT: 13.8 % (ref 11.5–14.5)
GLUCOSE BLD MANUAL STRIP-MCNC: 154 MG/DL (ref 74–99)
GLUCOSE BLD MANUAL STRIP-MCNC: 222 MG/DL (ref 74–99)
GLUCOSE BLD MANUAL STRIP-MCNC: 262 MG/DL (ref 74–99)
GLUCOSE BLD MANUAL STRIP-MCNC: 278 MG/DL (ref 74–99)
GLUCOSE SERPL-MCNC: 143 MG/DL (ref 74–99)
HCT VFR BLD AUTO: 30.9 % (ref 36–46)
HGB BLD-MCNC: 10 G/DL (ref 12–16)
HOLD SPECIMEN: NORMAL
MCH RBC QN AUTO: 28.5 PG (ref 26–34)
MCHC RBC AUTO-ENTMCNC: 32.4 G/DL (ref 32–36)
MCV RBC AUTO: 88 FL (ref 80–100)
NRBC BLD-RTO: 0 /100 WBCS (ref 0–0)
PLATELET # BLD AUTO: 363 X10*3/UL (ref 150–450)
POTASSIUM SERPL-SCNC: 3.5 MMOL/L (ref 3.5–5.3)
PROT SERPL-MCNC: 5.5 G/DL (ref 6.4–8.2)
RBC # BLD AUTO: 3.51 X10*6/UL (ref 4–5.2)
SODIUM SERPL-SCNC: 138 MMOL/L (ref 136–145)
WBC # BLD AUTO: 12.4 X10*3/UL (ref 4.4–11.3)

## 2025-08-11 PROCEDURE — 99232 SBSQ HOSP IP/OBS MODERATE 35: CPT | Performed by: INTERNAL MEDICINE

## 2025-08-11 PROCEDURE — 2500000004 HC RX 250 GENERAL PHARMACY W/ HCPCS (ALT 636 FOR OP/ED): Performed by: INTERNAL MEDICINE

## 2025-08-11 PROCEDURE — 85027 COMPLETE CBC AUTOMATED: CPT | Performed by: INTERNAL MEDICINE

## 2025-08-11 PROCEDURE — 74177 CT ABD & PELVIS W/CONTRAST: CPT | Performed by: RADIOLOGY

## 2025-08-11 PROCEDURE — 86140 C-REACTIVE PROTEIN: CPT | Performed by: INTERNAL MEDICINE

## 2025-08-11 PROCEDURE — 74177 CT ABD & PELVIS W/CONTRAST: CPT

## 2025-08-11 PROCEDURE — 99232 SBSQ HOSP IP/OBS MODERATE 35: CPT | Performed by: SURGERY

## 2025-08-11 PROCEDURE — 36415 COLL VENOUS BLD VENIPUNCTURE: CPT | Performed by: INTERNAL MEDICINE

## 2025-08-11 PROCEDURE — 2550000001 HC RX 255 CONTRASTS: Performed by: INTERNAL MEDICINE

## 2025-08-11 PROCEDURE — 2500000001 HC RX 250 WO HCPCS SELF ADMINISTERED DRUGS (ALT 637 FOR MEDICARE OP): Performed by: INTERNAL MEDICINE

## 2025-08-11 PROCEDURE — 84075 ASSAY ALKALINE PHOSPHATASE: CPT | Performed by: INTERNAL MEDICINE

## 2025-08-11 PROCEDURE — 2500000002 HC RX 250 W HCPCS SELF ADMINISTERED DRUGS (ALT 637 FOR MEDICARE OP, ALT 636 FOR OP/ED): Performed by: INTERNAL MEDICINE

## 2025-08-11 PROCEDURE — 2500000002 HC RX 250 W HCPCS SELF ADMINISTERED DRUGS (ALT 637 FOR MEDICARE OP, ALT 636 FOR OP/ED): Performed by: NURSE PRACTITIONER

## 2025-08-11 PROCEDURE — 1100000001 HC PRIVATE ROOM DAILY

## 2025-08-11 PROCEDURE — 82947 ASSAY GLUCOSE BLOOD QUANT: CPT

## 2025-08-11 RX ORDER — INSULIN GLARGINE-YFGN 100 [IU]/ML
34 INJECTION, SOLUTION SUBCUTANEOUS NIGHTLY
Status: DISCONTINUED | OUTPATIENT
Start: 2025-08-11 | End: 2025-08-12 | Stop reason: HOSPADM

## 2025-08-11 RX ORDER — VALSARTAN 160 MG/1
160 TABLET ORAL DAILY
Status: DISCONTINUED | OUTPATIENT
Start: 2025-08-12 | End: 2025-08-12 | Stop reason: HOSPADM

## 2025-08-11 RX ADMIN — INSULIN LISPRO 6 UNITS: 100 INJECTION, SOLUTION INTRAVENOUS; SUBCUTANEOUS at 20:45

## 2025-08-11 RX ADMIN — METRONIDAZOLE 500 MG: 500 SOLUTION INTRAVENOUS at 02:23

## 2025-08-11 RX ADMIN — CEFEPIME HYDROCHLORIDE 1 G: 1 INJECTION, SOLUTION INTRAVENOUS at 16:42

## 2025-08-11 RX ADMIN — ENOXAPARIN SODIUM 40 MG: 100 INJECTION SUBCUTANEOUS at 08:09

## 2025-08-11 RX ADMIN — INSULIN GLARGINE-YFGN 34 UNITS: 100 INJECTION, SOLUTION SUBCUTANEOUS at 20:45

## 2025-08-11 RX ADMIN — INSULIN LISPRO 2 UNITS: 100 INJECTION, SOLUTION INTRAVENOUS; SUBCUTANEOUS at 08:18

## 2025-08-11 RX ADMIN — CHOLECALCIFEROL (VITAMIN D3) 10 MCG (400 UNIT) TABLET 10 MCG: at 08:18

## 2025-08-11 RX ADMIN — VALSARTAN 80 MG: 160 TABLET, FILM COATED ORAL at 08:09

## 2025-08-11 RX ADMIN — CEFEPIME HYDROCHLORIDE 1 G: 1 INJECTION, SOLUTION INTRAVENOUS at 08:18

## 2025-08-11 RX ADMIN — ACETAMINOPHEN 975 MG: 325 TABLET ORAL at 14:54

## 2025-08-11 RX ADMIN — Medication 1 CAPSULE: at 08:09

## 2025-08-11 RX ADMIN — ACETAMINOPHEN 975 MG: 325 TABLET ORAL at 20:46

## 2025-08-11 RX ADMIN — INSULIN LISPRO 4 UNITS: 100 INJECTION, SOLUTION INTRAVENOUS; SUBCUTANEOUS at 16:41

## 2025-08-11 RX ADMIN — METRONIDAZOLE 500 MG: 500 SOLUTION INTRAVENOUS at 13:15

## 2025-08-11 RX ADMIN — CEFEPIME HYDROCHLORIDE 1 G: 1 INJECTION, SOLUTION INTRAVENOUS at 01:24

## 2025-08-11 RX ADMIN — LEVOTHYROXINE SODIUM 75 MCG: 0.07 TABLET ORAL at 06:30

## 2025-08-11 RX ADMIN — IOHEXOL 75 ML: 350 INJECTION, SOLUTION INTRAVENOUS at 11:47

## 2025-08-11 RX ADMIN — INSULIN LISPRO 6 UNITS: 100 INJECTION, SOLUTION INTRAVENOUS; SUBCUTANEOUS at 13:16

## 2025-08-11 RX ADMIN — ACETAMINOPHEN 975 MG: 325 TABLET ORAL at 08:09

## 2025-08-11 RX ADMIN — PANTOPRAZOLE SODIUM 40 MG: 40 TABLET, DELAYED RELEASE ORAL at 06:30

## 2025-08-11 ASSESSMENT — PAIN SCALES - GENERAL
PAINLEVEL_OUTOF10: 0 - NO PAIN
PAINLEVEL_OUTOF10: 0 - NO PAIN

## 2025-08-11 ASSESSMENT — COGNITIVE AND FUNCTIONAL STATUS - GENERAL
DRESSING REGULAR UPPER BODY CLOTHING: A LITTLE
DAILY ACTIVITIY SCORE: 19
STANDING UP FROM CHAIR USING ARMS: A LITTLE
CLIMB 3 TO 5 STEPS WITH RAILING: A LITTLE
PERSONAL GROOMING: A LITTLE
MOVING FROM LYING ON BACK TO SITTING ON SIDE OF FLAT BED WITH BEDRAILS: A LITTLE
HELP NEEDED FOR BATHING: A LITTLE
TOILETING: A LITTLE
MOBILITY SCORE: 20
DRESSING REGULAR LOWER BODY CLOTHING: A LITTLE
WALKING IN HOSPITAL ROOM: A LITTLE

## 2025-08-11 ASSESSMENT — PAIN - FUNCTIONAL ASSESSMENT: PAIN_FUNCTIONAL_ASSESSMENT: 0-10

## 2025-08-11 NOTE — PROGRESS NOTES
"General/Trauma Surgery Daily Progress Note    Subjective   Complains of pain in lower abdomen. Drain has serous output. Denies n/v. Denies fevers or chills. Tolerating clears without issue.       Objective   Last Recorded Vitals:  Blood pressure 161/78, pulse 80, temperature 36.2 °C (97.2 °F), temperature source Temporal, resp. rate 17, height 1.575 m (5' 2\"), weight 68 kg (150 lb), SpO2 96%.    Intake/Output last 3 Shifts:  I/O last 3 completed shifts:  In: 2564 (37.7 mL/kg) [P.O.:630; I.V.:827 (12.2 mL/kg); Other:7; IV Piggyback:1100]  Out: 386 (5.7 mL/kg) [Urine:350 (0.1 mL/kg/hr); Drains:36]  Weight: 68 kg     Pain Score:  0-10 (Numeric) Pain Score: 0 - No pain     Physical Exam:  Constitutional: No acute distress, sitting up in bed.  Neuro: Alert, oriented. Follows commands.   Eyes: EOMI. No scleral icterus. Conjunctiva pink.  ENT: MMM.   Heart: Regular rate.  Respiratory: No increased work of breathing or audible wheeze.  Abdomen: Soft, nondistended. Appropriately tender. Drain in place mid lower abdomen. Serous drainage.  MSK: Moves all extremities.  Vascular: Palpable pulses throughout, no pitting edema.  Skin: No rashes.   Psychological: Appropriate mood and behavior.            Relevant Results  Laboratory Results:  CBC:   Lab Results   Component Value Date    WBC 12.4 (H) 08/11/2025    RBC 3.51 (L) 08/11/2025    HGB 10.0 (L) 08/11/2025    HCT 30.9 (L) 08/11/2025     08/11/2025       RFP:   Lab Results   Component Value Date     08/11/2025    K 3.5 08/11/2025     08/11/2025    CO2 24 08/11/2025    BUN 8 08/11/2025    CREATININE 0.69 08/11/2025    CALCIUM 8.3 (L) 08/11/2025    MG 1.54 (L) 08/10/2025        LFTs:   Lab Results   Component Value Date    PROT 5.5 (L) 08/11/2025    ALBUMIN 2.9 (L) 08/11/2025    BILITOT 0.3 08/11/2025    ALKPHOS 53 08/11/2025    AST 13 08/11/2025    ALT 8 08/11/2025             Imaging  No results found.    Cardiology, Vascular, and Other Imaging  No other " imaging results found for the past 2 days           Assessment/Plan   This is a 89 y.o. female who presents with complaints of abdominal pain starting Friday. Directed to ED after PCP obtained CT that reported acute appendicitis with possible contained perforation.      Had drain placed by IR last week. Serous fluid. Complains of persistent pain in lower abdomen. Will get CT per Dr. Tobias.        Plan:  -- CT abd/pelv IV contrast  -- drain will need flushed BID - 8cc toward body, 3cc toward bag with sterile saline flush  -- IV Cefepime, Flagyl, fluconazole  -- Diet as tolerated  -- IV fluid hydration, multimodal pain regimen, antiemetics, bowel regimen                    Seen and discussed with Dr. Tobias who is in agreement with plan. Please secure chat with questions.     Marce Coyne PA-C

## 2025-08-11 NOTE — PROGRESS NOTES
Patient: Nika Alejandre  Room/bed: 139/139-A  Admitted on: 8/5/2025    Age: 89 y.o.   Gender: female  Code Status:  No Order   Admitting Dx: Abscess [L02.91]  Hyponatremia [E87.1]  Lower abdominal pain [R10.30]  Perforated appendicitis [K35.32]    MRN: [id number]  PCP: Karson Holt MD       Subjective   Thinks she is a little better again. Did eat a little more. No new issues    Objective    Physical Exam  Constitutional:       Appearance: Normal appearance.   HENT:      Head: Normocephalic.      Ears:      Comments: Slightly [place]     Nose: Nose normal.      Mouth/Throat:      Mouth: Mucous membranes are moist.     Eyes:      Pupils: Pupils are equal, round, and reactive to light.     Neck:      Comments: No jvd  Cardiovascular:      Pulses: Normal pulses.      Comments: Regular, S4. Intermittent 1/6 short systolic murmur  Pulmonary:      Effort: Pulmonary effort is normal.      Breath sounds: Normal breath sounds.   Abdominal:      Comments: Soft, mildly distended, maybe a little better  Bowel sounds are sluggish  Tenderness primarily in RLQ this am, more localized     Musculoskeletal:      Comments: No edema. Equal pulses     Skin:     General: Skin is warm and dry.     Neurological:      Mental Status: She is alert and oriented to person, place, and time. Mental status is at baseline.     Psychiatric:         Mood and Affect: Mood normal.         Behavior: Behavior normal.          Temp:  [35.8 °C (96.4 °F)-37 °C (98.6 °F)] 36.2 °C (97.2 °F)  Heart Rate:  [56-82] 82  Resp:  [16-18] 17  BP: (148-165)/(66-79) 161/78            I/Os    Intake/Output Summary (Last 24 hours) at 8/11/2025 0900  Last data filed at 8/11/2025 0812  Gross per 24 hour   Intake 1571 ml   Output 118 ml   Net 1453 ml       Labs:   Results from last 72 hours   Lab Units 08/11/25  0527 08/10/25  0540 08/09/25  0610   SODIUM mmol/L 138 136 134*   POTASSIUM mmol/L 3.5 3.4* 3.0*   CHLORIDE mmol/L 105 105 103   CO2 mmol/L 24 21 20*   BUN mg/dL 8 5*  "8   CREATININE mg/dL 0.69 0.69 0.68   GLUCOSE mg/dL 143* 149* 143*   CALCIUM mg/dL 8.3* 7.9* 7.8*   ANION GAP mmol/L 13 13 14   EGFR mL/min/1.73m*2 83 83 83      Results from last 72 hours   Lab Units 08/11/25  0527 08/09/25  0610   WBC AUTO x10*3/uL 12.4* 12.8*   HEMOGLOBIN g/dL 10.0* 10.3*   HEMATOCRIT % 30.9* 31.1*   PLATELETS AUTO x10*3/uL 363 295   NEUTROS PCT AUTO %  --  72.9   LYMPHS PCT AUTO %  --  14.3   MONOS PCT AUTO %  --  7.5   EOS PCT AUTO %  --  1.2      Lab Results   Component Value Date    CALCIUM 8.3 (L) 08/11/2025    PHOS 2.4 (L) 11/09/2018      Lab Results   Component Value Date    CRP 6.48 (H) 08/11/2025      [unfilled]     Micro/ID:   No results found for the last 90 days.                   No lab exists for component: \"AGALPCRNB\"   .ID  No results found for: \"URINECULTURE\", \"BLOODCULT\", \"CSFCULTSMEAR\"    Images:  US guided abscess fluid collection drainage  Narrative: Interpreted By:  Josue Fried,  and Ami Krueger   STUDY:  US GUIDED ABSCESS FLUID COLLECTION DRAINAGE;  8/8/2025 3:42 pm      INDICATION:  Signs/Symptoms:pelvic abscess and pain.      COMPARISON:  CT ABDOMEN PELVIS W IV CONTRAST 8/7/2025, CT ABDOMEN PELVIS W IV  CONTRAST 8/5/2025      ACCESSION NUMBER(S):  [id number]      ORDERING CLINICIAN:  JOSUE FRIED      TECHNIQUE:  INTERVENTIONALIST(S):  MD Evans [place] MD      CONSENT:  The patient/patient's POA/next of kin was informed of the nature of  the proposed procedure. The purposes, alternatives, risks, and  benefits were explained and discussed. All questions were answered  and consent was obtained.          SEDATION:  Moderate conscious IV sedation services (supervision of  administration, induction, and maintenance) were provided by the  physician performing the procedure with intravenous fentanyl 50 mcg  and versed 0.5 mg for 9 minutes. The physician was assisted by an  independent trained observer, an interventional radiology nurse, in  the continuous " monitoring of patient level of consciousness and  physiologic status.      MEDICATION/CONTRAST:  No additional      TIME OUT:  A time out was performed immediately prior to procedure start with  the interventional team, correctly identifying the patient name, date  of birth, MRN, procedure, anatomy (including marking of site and  side), patient position, procedure consent form, relevant laboratory  and imaging test results, antibiotic administration, safety  precautions, and procedure-specific equipment needs.      COMPLICATIONS:  No immediate adverse events identified.      FINDINGS:  Limited ultrasonographic images as well as axial CT images were  obtained through the right lower quadrant for the purposes of needle  guidance were taken. The images demonstrate heterogeneous  predominantly hypoechoic small fluid collection posterior to the  bladder as noted on the prior CT dated 08/07/2025.      Patient was placed in supine position and prepped in the usual  sterile manner. Lidocaine was used for local anesthesia. Utilizing  ultrasound guidance a 19G Yueh needle was used to access the fluid  collection located posterior to the bladder, purulent appearance  fluid were obtained. A 035 wire was then used to maintain access in  the collection after removal of the Yueh needle. Initial aspiration  revealed bloody and purulent fluid. A small nick was made along the  skin with 11 gauge scalpel blade, for which a 8 Croatian pigtail  drainage catheter was subsequently placed in the collection. The  pigtail drain was formed, connected to a EVELYNE drain, and  sutured/secured in place. Stayfix fixation catheter was also placed  to further secure the catheter.      Postprocedure images demonstrated no evidence of hemorrhage.      Fluid sample was sent to the laboratory for further analysis.      The patient tolerated the procedure well without any immediate  complications.      Impression: Uneventful ultrasound guided 8 Croatian pigtail  drainage catheter  placement for the right lower quadrant small fluid collection with  purulent fluid collection, as detailed above. Fluid sample was sent  to the laboratory for further analysis.      I was present for and/or performed the critical portions of the  procedure and immediately available throughout the entire procedure.      I personally reviewed the image(s)/study and interpretation. I agree  with the findings as stated.      Performed and dictated at [facility].      MACRO:  None      Signed by: Marisel Fried 8/8/2025 8:47 PM  Dictation workstation:   [id number]    Scheduled medications  Scheduled Medications[1]  Continuous medications  Continuous Medications[2]  PRN medications  PRN Medications[3]     Assessment and Plan    Nika Alejandre is a 89 y.o. female   Perforated appendix, status post drain placement, Day 4 of drain placement. Fluid is serous, clear to white. CT today, per surgery. Afebrile. Still waiting on culture results from fluid specimen. Continue flagyl and cefepime in meantime. CRP decreasing  CAD, stable  Hypertension. Pressure on higher side, but still uncomfortable at times and getting a little anxious about going home  DM. Sugars improved. Continue to monitor.   Electrolytes improved. Continue to monitor    Denia Blankenship MD         [1] acetaminophen, 975 mg, oral, TID  cefepime, 1 g, intravenous, q8h JAILENE  cholecalciferol, 10 mcg, oral, Daily  enoxaparin, 40 mg, subcutaneous, Daily  fluconazole, 400 mg, intravenous, q24h  insulin glargine-yfgn, 18 Units, subcutaneous, Nightly  insulin lispro, 0-10 Units, subcutaneous, With meals & nightly  lactobacillus acidophilus, 1 capsule, oral, Daily  levothyroxine, 75 mcg, oral, Daily  metroNIDAZOLE, 500 mg, intravenous, q8h JAILENE  pantoprazole, 40 mg, oral, Daily before breakfast  valsartan, 80 mg, oral, Daily  [2]    [3] PRN medications: ondansetron, oxyCODONE, oxyCODONE

## 2025-08-11 NOTE — PROGRESS NOTES
Nika Alejandre is a 89 y.o. female on day 6 of admission presenting with Lower abdominal pain.    Subjective   Interval History: no fever, no new complaints, eating breakfast        Review of Systems    Objective   Range of Vitals (last 24 hours)  Heart Rate:  [56-82]   Temp:  [35.8 °C (96.4 °F)-37 °C (98.6 °F)]   Resp:  [16-18]   BP: (148-165)/(66-79)   SpO2:  [94 %-97 %]        Daily Weight  08/05/25 : 68 kg (150 lb)    Body mass index is 27.44 kg/m².    Physical Exam  Constitutional:       Appearance: Normal appearance.   HENT:      Head: Normocephalic and atraumatic.      Mouth/Throat:      Mouth: Mucous membranes are moist.      Pharynx: Oropharynx is clear.     Eyes:      Pupils: Pupils are equal, round, and reactive to light.       Cardiovascular:      Rate and Rhythm: Normal rate and regular rhythm.      Heart sounds: Normal heart sounds.   Pulmonary:      Effort: Pulmonary effort is normal.      Breath sounds: Normal breath sounds.   Abdominal:      General: Abdomen is flat. Bowel sounds are normal.      Palpations: Abdomen is soft.      Tenderness: There is abdominal tenderness.      Comments: drain     Musculoskeletal:      Cervical back: Normal range of motion.     Neurological:      Mental Status: She is alert.         Antibiotics  cefepime - 1 gram/50 mL  metroNIDAZOLE - 500 mg/100 mL    Relevant Results  Labs  Results from last 72 hours   Lab Units 08/11/25  0527 08/09/25  0610   WBC AUTO x10*3/uL 12.4* 12.8*   HEMOGLOBIN g/dL 10.0* 10.3*   HEMATOCRIT % 30.9* 31.1*   PLATELETS AUTO x10*3/uL 363 295   NEUTROS PCT AUTO %  --  72.9   LYMPHS PCT AUTO %  --  14.3   MONOS PCT AUTO %  --  7.5   EOS PCT AUTO %  --  1.2     Results from last 72 hours   Lab Units 08/11/25  0527 08/10/25  0540 08/09/25  0610   SODIUM mmol/L 138 136 134*   POTASSIUM mmol/L 3.5 3.4* 3.0*   CHLORIDE mmol/L 105 105 103   CO2 mmol/L 24 21 20*   BUN mg/dL 8 5* 8   CREATININE mg/dL 0.69 0.69 0.68   GLUCOSE mg/dL 143* 149* 143*   CALCIUM  mg/dL 8.3* 7.9* 7.8*   ANION GAP mmol/L 13 13 14   EGFR mL/min/1.73m*2 83 83 83     Results from last 72 hours   Lab Units 08/11/25  0527 08/09/25  0610   ALK PHOS U/L 53 63   BILIRUBIN TOTAL mg/dL 0.3 0.3   PROTEIN TOTAL g/dL 5.5* 5.4*   ALT U/L 8 9   AST U/L 13 12   ALBUMIN g/dL 2.9* 2.9*     Estimated Creatinine Clearance: 49 mL/min (by C-G formula based on SCr of 0.69 mg/dL).  C-Reactive Protein   Date Value Ref Range Status   08/11/2025 6.48 (H) <1.00 mg/dL Final   08/09/2025 11.12 (H) <1.00 mg/dL Final   08/07/2025 13.41 (H) <1.00 mg/dL Final     Microbiology  Reviewed  Imaging  Reviewed        Assessment/Plan        Perforated appendicitis / abscess, sp drain placement, the culture is pending, for a repeat CT     Recommendations :  Continue Cefepime, Flagyl and Fluconazole, plan on oral antibiotics once the cultures are in  Discussed with the medical team        I spent minutes in the professional and overall care of this patient.  The cultures and susceptibilities were reviewed and discussed with the micro lab, the antibiotics stewardship guidelines were reviewed, the infection control protocols and recommendations were reviewed with the patient and the staff            Martín Almanzar MD

## 2025-08-11 NOTE — PROGRESS NOTES
08/11/25 1259   Discharge Planning   Living Arrangements Alone  (Both son's live on each side of her)   Support Systems Children   Assistance Needed Patient is A&O X3, on room air at baseline, does not uses a CPAP/BIPAP at home, is not currently active with any community/home care agencies, is independent with ADLs, does not drive and ambulates with the use of a walker. Patient denies further needs upon discharge.   Type of Residence Private residence   Number of Stairs to Enter Residence 3   Number of Stairs Within Residence 0   Do you have animals or pets at home? No   Who is requesting discharge planning? Provider   Home or Post Acute Services None   Expected Discharge Disposition Home   Does the patient need discharge transport arranged? No   Stroke Family Assessment   Stroke Family Assessment Needed No   Intensity of Service   Intensity of Service 0-30 min

## 2025-08-11 NOTE — PROGRESS NOTES
"Nutrition Follow Up Assessment:   Nutrition Assessment       Patient is a 89 y.o. female presenting with abdominal pain 2/2 acute appendicitis with probable perforation. Pt s/p IR drain placement on (8/8).     Plan for CT given continued c/o persistent abdominal pain.    Nutrition History:  Food and Nutrient History: Follow up visit attempted, pt receiving care from multiple bedside nurses at that time. Per nursing documentation, has been eating % at the past couple of meals. Will continue Ensure Max ordered by provider & discontinue Ensure Clear at this time.  Food Allergy:  (None)     Anthropometrics:  Height: 157.5 cm (5' 2\")   Weight: 68 kg (150 lb)   BMI (Calculated): 27.43  IBW/kg (Dietitian Calculated): 50 kg  Percent of IBW: 136 %    Weight History:   Weight         8/5/2025  1022             Weight: 68 kg (150 lb)     Weight Change %:  Weight History / % Weight Change: No additional weights in EMR since admission    Nutrition Focused Physical Exam Findings:  Edema:  Edema: none  Physical Findings:  Skin:  (Intact)  Digestive System Findings: Abdominal pain  Mouth Findings:  (None)    Nutrition Significant Labs:  BMP Trend:   Results from last 7 days   Lab Units 08/11/25  0527 08/10/25  0540 08/09/25  0610 08/08/25  0548   GLUCOSE mg/dL 143* 149* 143* 74   CALCIUM mg/dL 8.3* 7.9* 7.8* 8.1*   SODIUM mmol/L 138 136 134* 136   POTASSIUM mmol/L 3.5 3.4* 3.0* 3.1*   CO2 mmol/L 24 21 20* 21   CHLORIDE mmol/L 105 105 103 105   BUN mg/dL 8 5* 8 10   CREATININE mg/dL 0.69 0.69 0.68 0.71      Nutrition Specific Medications:  Scheduled medications  Scheduled Medications[1]  Continuous medications  Continuous Medications[2]  PRN medications  PRN Medications[3]    I/O:   Last BM Date: 08/09/25; Stool Appearance: Loose, Watery (08/09/25 1747)    Dietary Orders (From admission, onward)       Start     Ordered    08/10/25 0918  Adult diet Consistent Carb; CCD 60 gm/meal; Soft and bite sized 6  Diet effective now      "   Question Answer Comment   Diet type Consistent Carb    Carb diet selection: CCD 60 gm/meal    Texture Soft and bite sized 6        08/10/25 0920    08/09/25 0842  Oral nutritional supplements  Until discontinued        Question Answer Comment   Deliver with Breakfast    Deliver with Dinner    Select supplement: Ensure Max        08/09/25 0842    08/06/25 1404  Oral nutritional supplements  Until discontinued        Comments: Berry flavor preference   Question Answer Comment   Deliver with Breakfast    Deliver with Dinner    Select supplement: Ensure Clear        08/06/25 1404    08/05/25 1619  May Participate in Room Service With Assistance  ( ROOM SERVICE MAY PARTICIPATE WITH ASSISTANCE)  Once        Question:  .  Answer:  Yes    08/05/25 1618             Estimated Needs:   Total Energy Estimated Needs in 24 hours (kCal):  (1500+)  Method for Estimating Needs: ~30 kcals/kg x IBW  Total Protein Estimated Needs in 24 Hours (g):  (50-60)  Method for Estimating 24 Hour Protein Needs: 1.0-1.2 g/kg x IBW  Total Fluid Estimated Needs in 24 Hours (mL):  (1500+)  Method for Estimating 24 Hour Fluid Needs: 1mL/kcal or per team        Nutrition Diagnosis   Malnutrition Diagnosis  Patient has Malnutrition Diagnosis: No    Nutrition Diagnosis  Patient has Nutrition Diagnosis: Yes  Diagnosis Status (1): Active  Nutrition Diagnosis 1: Altered GI function  Related to (1): acute appendicitis w/ probable perforation  As Evidenced by (1): abdominal pain w/ suboptimal PO intake       Nutrition Interventions/Recommendations      Nutrition Recommendations:  Recommend liberalizing diet as able to promote PO intake    Obtain an updated measured weight as able    Nutrition Interventions/Goals:   Medical Food Supplement: Commercial beverage medical food supplement therapy  Goal: Ensure Max BID (150 kcals/30g protein each)      Nutrition Monitoring and Evaluation   Estimated Energy Intake: Energy intake 50 -75% of estimated energy  needs    Time Spent (min): 60 minutes            [1] acetaminophen, 975 mg, oral, TID  cefepime, 1 g, intravenous, q8h JAILENE  cholecalciferol, 10 mcg, oral, Daily  enoxaparin, 40 mg, subcutaneous, Daily  insulin glargine-yfgn, 34 Units, subcutaneous, Nightly  insulin lispro, 0-10 Units, subcutaneous, With meals & nightly  lactobacillus acidophilus, 1 capsule, oral, Daily  levothyroxine, 75 mcg, oral, Daily  pantoprazole, 40 mg, oral, Daily before breakfast  [START ON 8/12/2025] valsartan, 160 mg, oral, Daily  [2]    [3] PRN medications: ondansetron, oxyCODONE, oxyCODONE

## 2025-08-11 NOTE — CARE PLAN
The patient's goals for the shift include  remaining comfortable.    The clinical goals for the shift include patient will have vital signs WNL

## 2025-08-12 VITALS
RESPIRATION RATE: 16 BRPM | DIASTOLIC BLOOD PRESSURE: 71 MMHG | WEIGHT: 150 LBS | TEMPERATURE: 98.8 F | OXYGEN SATURATION: 95 % | HEIGHT: 62 IN | HEART RATE: 79 BPM | SYSTOLIC BLOOD PRESSURE: 168 MMHG | BODY MASS INDEX: 27.6 KG/M2

## 2025-08-12 PROBLEM — R10.30 LOWER ABDOMINAL PAIN: Status: RESOLVED | Noted: 2025-08-05 | Resolved: 2025-08-12

## 2025-08-12 PROBLEM — K35.32 APPENDICITIS WITH PERFORATION: Status: RESOLVED | Noted: 2025-08-05 | Resolved: 2025-08-12

## 2025-08-12 PROBLEM — E11.39 TYPE 2 DIABETES MELLITUS WITH OPHTHALMIC COMPLICATION (MULTI): Chronic | Status: RESOLVED | Noted: 2025-08-05 | Resolved: 2025-08-12

## 2025-08-12 LAB
BACTERIA FLD CULT: ABNORMAL
BACTERIA FLD CULT: ABNORMAL
GLUCOSE BLD MANUAL STRIP-MCNC: 122 MG/DL (ref 74–99)
GLUCOSE BLD MANUAL STRIP-MCNC: 189 MG/DL (ref 74–99)
GRAM STN SPEC: ABNORMAL
GRAM STN SPEC: ABNORMAL

## 2025-08-12 PROCEDURE — 99239 HOSP IP/OBS DSCHRG MGMT >30: CPT | Performed by: NURSE PRACTITIONER

## 2025-08-12 PROCEDURE — 2500000004 HC RX 250 GENERAL PHARMACY W/ HCPCS (ALT 636 FOR OP/ED): Performed by: INTERNAL MEDICINE

## 2025-08-12 PROCEDURE — 2500000002 HC RX 250 W HCPCS SELF ADMINISTERED DRUGS (ALT 637 FOR MEDICARE OP, ALT 636 FOR OP/ED): Performed by: INTERNAL MEDICINE

## 2025-08-12 PROCEDURE — 99232 SBSQ HOSP IP/OBS MODERATE 35: CPT | Performed by: SURGERY

## 2025-08-12 PROCEDURE — 2500000001 HC RX 250 WO HCPCS SELF ADMINISTERED DRUGS (ALT 637 FOR MEDICARE OP): Performed by: INTERNAL MEDICINE

## 2025-08-12 PROCEDURE — 2500000002 HC RX 250 W HCPCS SELF ADMINISTERED DRUGS (ALT 637 FOR MEDICARE OP, ALT 636 FOR OP/ED): Performed by: NURSE PRACTITIONER

## 2025-08-12 PROCEDURE — 82947 ASSAY GLUCOSE BLOOD QUANT: CPT

## 2025-08-12 RX ORDER — METRONIDAZOLE 500 MG/1
500 TABLET ORAL 3 TIMES DAILY
Qty: 30 TABLET | Refills: 0 | Status: SHIPPED | OUTPATIENT
Start: 2025-08-12 | End: 2025-08-22

## 2025-08-12 RX ORDER — CEFDINIR 300 MG/1
300 CAPSULE ORAL 2 TIMES DAILY
Qty: 20 CAPSULE | Refills: 0 | Status: SHIPPED | OUTPATIENT
Start: 2025-08-12 | End: 2025-08-22

## 2025-08-12 RX ADMIN — CHOLECALCIFEROL (VITAMIN D3) 10 MCG (400 UNIT) TABLET 10 MCG: at 08:02

## 2025-08-12 RX ADMIN — VALSARTAN 160 MG: 160 TABLET, FILM COATED ORAL at 08:02

## 2025-08-12 RX ADMIN — CEFEPIME HYDROCHLORIDE 1 G: 1 INJECTION, SOLUTION INTRAVENOUS at 01:06

## 2025-08-12 RX ADMIN — LEVOTHYROXINE SODIUM 75 MCG: 0.07 TABLET ORAL at 05:32

## 2025-08-12 RX ADMIN — PANTOPRAZOLE SODIUM 40 MG: 40 TABLET, DELAYED RELEASE ORAL at 05:32

## 2025-08-12 RX ADMIN — ENOXAPARIN SODIUM 40 MG: 100 INJECTION SUBCUTANEOUS at 08:02

## 2025-08-12 RX ADMIN — Medication 1 CAPSULE: at 08:02

## 2025-08-12 RX ADMIN — INSULIN LISPRO 2 UNITS: 100 INJECTION, SOLUTION INTRAVENOUS; SUBCUTANEOUS at 12:25

## 2025-08-12 ASSESSMENT — COGNITIVE AND FUNCTIONAL STATUS - GENERAL
HELP NEEDED FOR BATHING: A LITTLE
MOBILITY SCORE: 20
PERSONAL GROOMING: A LITTLE
CLIMB 3 TO 5 STEPS WITH RAILING: A LITTLE
MOVING FROM LYING ON BACK TO SITTING ON SIDE OF FLAT BED WITH BEDRAILS: A LITTLE
WALKING IN HOSPITAL ROOM: A LITTLE
DAILY ACTIVITIY SCORE: 19
DRESSING REGULAR LOWER BODY CLOTHING: A LITTLE
TOILETING: A LITTLE
STANDING UP FROM CHAIR USING ARMS: A LITTLE
DRESSING REGULAR UPPER BODY CLOTHING: A LITTLE

## 2025-08-12 ASSESSMENT — PAIN SCALES - GENERAL: PAINLEVEL_OUTOF10: 0 - NO PAIN

## 2025-08-12 ASSESSMENT — PAIN - FUNCTIONAL ASSESSMENT: PAIN_FUNCTIONAL_ASSESSMENT: 0-10

## 2025-08-12 NOTE — CARE PLAN
The patient's goals for the shift include resting.     The clinical goals for the shift include pt will remain comfortable throughout this shift

## 2025-08-12 NOTE — PROGRESS NOTES
"General/Trauma Surgery Daily Progress Note    Subjective   Pain improved. Drain has had minimal serous output. Tolerating diet. Denies n/v, f/c.       Objective   Last Recorded Vitals:  Blood pressure 168/71, pulse 79, temperature 37.1 °C (98.8 °F), temperature source Temporal, resp. rate 16, height 1.575 m (5' 2\"), weight 68 kg (150 lb), SpO2 95%.    Intake/Output last 3 Shifts:  I/O last 3 completed shifts:  In: 2150 (31.6 mL/kg) [P.O.:1550; IV Piggyback:600]  Out: 818 (12 mL/kg) [Urine:800 (0.3 mL/kg/hr); Drains:18]  Weight: 68 kg     Pain Score:  0-10 (Numeric) Pain Score: 0 - No pain     Physical Exam:  Constitutional: No acute distress, sitting up in bed.  Neuro: Alert, oriented. Follows commands.   Eyes: EOMI. No scleral icterus. Conjunctiva pink.  ENT: MMM.   Heart: Regular rate.  Respiratory: No increased work of breathing or audible wheeze.  Abdomen: Soft, nondistended. Appropriately tender. Incisions clean, dry, intact.   MSK: Moves all extremities.  Vascular: Palpable pulses throughout, no pitting edema.  Skin: No rashes.   Psychological: Appropriate mood and behavior.            Relevant Results  Laboratory Results:  CBC:   Lab Results   Component Value Date    WBC 12.4 (H) 08/11/2025    RBC 3.51 (L) 08/11/2025    HGB 10.0 (L) 08/11/2025    HCT 30.9 (L) 08/11/2025     08/11/2025       RFP:   Lab Results   Component Value Date     08/11/2025    K 3.5 08/11/2025     08/11/2025    CO2 24 08/11/2025    BUN 8 08/11/2025    CREATININE 0.69 08/11/2025    CALCIUM 8.3 (L) 08/11/2025    MG 1.54 (L) 08/10/2025        LFTs:   Lab Results   Component Value Date    PROT 5.5 (L) 08/11/2025    ALBUMIN 2.9 (L) 08/11/2025    BILITOT 0.3 08/11/2025    ALKPHOS 53 08/11/2025    AST 13 08/11/2025    ALT 8 08/11/2025         Susceptibility data from last 90 days.  Collected Specimen Info Organism Amoxicillin/Clavulanate Ampicillin Ampicillin/Sulbactam Cefazolin Cefepime Ciprofloxacin Gentamicin Levofloxacin " Piperacillin/Tazobactam Trimethoprim/Sulfamethoxazole   08/08/25 Fluid from Intra-abdominal Abscess Citrobacter freundii complex  R  R  R  R  S  S  S  S  S  S     Escherichia coli   S   S   S  S  S  S  S           Imaging  CT abdomen pelvis w IV contrast  Result Date: 8/11/2025  Redemonstration of findings consistent with perforated acute appendicitis. Interval collapse of a small fluid collection located between the urinary bladder and uterus status post placement of pigtail drainage catheter. Correlate clinically and follow-up as needed.   Signed by: Rafiq Orr 8/11/2025 1:26 PM Dictation workstation:   [id number]      Cardiology, Vascular, and Other Imaging  No other imaging results found for the past 2 days           Assessment/Plan   This is a 89 y.o. female who presents with complaints of abdominal pain starting Friday. Directed to ED after PCP obtained CT that reported acute appendicitis with possible contained perforation.      Had drain placed by IR last week. Serous fluid. Complains of persistent pain in lower abdomen. Repeated CT without progression. Fluid collection decreased.           Plan:  -- Drain removed  -- ID consulted - pending sensitivities  -- Diet as tolerated  -- IV fluid hydration, multimodal pain regimen, antiemetics, bowel regimen  -- Follow up in 1 week with Dr. Tobias  -- Updated DC instructions                 Seen and discussed with Dr. Tobias who is in agreement with plan. Please secure chat with questions.     Marce Coyne PA-C

## 2025-08-12 NOTE — CARE PLAN
The patient's goals for the shift include to go home    The clinical goals for the shift include all results will be back and WDL    Over the shift, the patient did not make progress toward the following goals. Barriers to progression include acuteness of illness. Recommendations to address these barriers include hourly rounding and administration of prescribed treatments.

## 2025-08-12 NOTE — PROGRESS NOTES
Nika Alejandre is a 89 y.o. female on day 7 of admission presenting with Lower abdominal pain.    Subjective   Interval History: no fever, no new complaints,        Review of Systems    Objective   Range of Vitals (last 24 hours)  Heart Rate:  [50-86]   Temp:  [36.1 °C (97 °F)-37.1 °C (98.8 °F)]   Resp:  [16-26]   BP: (121-181)/(58-99)   SpO2:  [81 %-98 %]        Daily Weight  08/05/25 : 68 kg (150 lb)    Body mass index is 27.44 kg/m².    Physical Exam  Constitutional:       Appearance: Normal appearance.   HENT:      Head: Normocephalic and atraumatic.      Mouth/Throat:      Mouth: Mucous membranes are moist.      Pharynx: Oropharynx is clear.     Eyes:      Pupils: Pupils are equal, round, and reactive to light.       Cardiovascular:      Rate and Rhythm: Normal rate and regular rhythm.      Heart sounds: Normal heart sounds.   Pulmonary:      Effort: Pulmonary effort is normal.      Breath sounds: Normal breath sounds.   Abdominal:      General: Abdomen is flat. Bowel sounds are normal.      Palpations: Abdomen is soft.      Tenderness: There is abdominal tenderness.      Comments: drain     Musculoskeletal:      Cervical back: Normal range of motion.     Neurological:      Mental Status: She is alert.         Antibiotics  cefdinir - 300 mg  cefepime - 1 gram/50 mL  metroNIDAZOLE - 500 mg    Relevant Results  Labs  Results from last 72 hours   Lab Units 08/11/25  0527   WBC AUTO x10*3/uL 12.4*   HEMOGLOBIN g/dL 10.0*   HEMATOCRIT % 30.9*   PLATELETS AUTO x10*3/uL 363     Results from last 72 hours   Lab Units 08/11/25  0527 08/10/25  0540   SODIUM mmol/L 138 136   POTASSIUM mmol/L 3.5 3.4*   CHLORIDE mmol/L 105 105   CO2 mmol/L 24 21   BUN mg/dL 8 5*   CREATININE mg/dL 0.69 0.69   GLUCOSE mg/dL 143* 149*   CALCIUM mg/dL 8.3* 7.9*   ANION GAP mmol/L 13 13   EGFR mL/min/1.73m*2 83 83     Results from last 72 hours   Lab Units 08/11/25  0527   ALK PHOS U/L 53   BILIRUBIN TOTAL mg/dL 0.3   PROTEIN TOTAL g/dL 5.5*    ALT U/L 8   AST U/L 13   ALBUMIN g/dL 2.9*     Estimated Creatinine Clearance: 49 mL/min (by C-G formula based on SCr of 0.69 mg/dL).  C-Reactive Protein   Date Value Ref Range Status   08/11/2025 6.48 (H) <1.00 mg/dL Final   08/09/2025 11.12 (H) <1.00 mg/dL Final   08/07/2025 13.41 (H) <1.00 mg/dL Final     Microbiology  Reviewed  Imaging  Reviewed        Assessment/Plan        Perforated appendicitis / abscess, sp drain placement, the culture with E. Coli / Citrobacter, the repeat CT was reviewed     Recommendations :  Plan on Cefdinir and Flagyl x 10 days with discharge  Discussed with the medical team        I spent minutes in the professional and overall care of this patient.  The cultures and susceptibilities were reviewed and discussed with the micro lab, the antibiotics stewardship guidelines were reviewed, the infection control protocols and recommendations were reviewed with the patient and the staff            Martín Almanzar MD

## 2025-08-16 NOTE — DISCHARGE SUMMARY
Discharge Diagnosis  Acute appendicitis       Issues Requiring Follow-Up  Post op follow up    Discharge Meds     Medication List      START taking these medications     cefdinir 300 mg capsule; Commonly known as: Omnicef; Take 1 capsule (300   mg) by mouth 2 times a day for 10 days.   metroNIDAZOLE 500 mg tablet; Commonly known as: Flagyl; Take 1 tablet   (500 mg) by mouth 3 times a day for 10 days.     CONTINUE taking these medications     CALCIUM MAGNESIUM ORAL   clopidogrel 75 mg tablet; Commonly known as: Plavix   Lantus Solostar U-100 Insulin 100 unit/mL (3 mL) pen; Generic drug:   insulin glargine   levothyroxine 75 mcg tablet; Commonly known as: Synthroid, Levoxyl   NovoLOG Flexpen U-100 Insulin 100 unit/mL (3 mL) pen; Generic drug:   insulin aspart   rosuvastatin 5 mg tablet; Commonly known as: Crestor   valsartan 160 mg tablet; Commonly known as: Diovan   Vitamin D3 10 mcg (400 units) tablet; Generic drug: cholecalciferol       Test Results Pending At Discharge  Pending Labs       No current pending labs.            Hospital Course   This is a 89 y.o. female who presents with complaints of abdominal pain starting Friday. Directed to ED after PCP obtained CT that reported acute appendicitis with possible contained perforation. Per documentation, she had a drain placed by IR last week. The patient complained of persistent pain in lower abdomen. Repeated CT without progression. Discharged on Cefdinir and Flagyl x 10 days. Follow up in 1 week with Dr. Tobias.    Discharge time > 30 minutes    Pertinent Physical Exam At Time of Discharge  Physical Exam  HENT:      Mouth/Throat:      Mouth: Mucous membranes are moist.   Abdominal:      General: There is distension.      Palpations: Abdomen is soft.     Musculoskeletal:         General: No swelling.     Neurological:      Mental Status: She is alert.         Outpatient Follow-Up  Future Appointments   Date Time Provider Department Center   8/19/2025 10:30 AM Tobias  MD Micheline [id number] [facility]         Apryl Espinal, APRN-CNP

## 2025-08-19 ENCOUNTER — APPOINTMENT (OUTPATIENT)
Dept: SURGERY | Facility: CLINIC | Age: OVER 89
End: 2025-08-19
Payer: MEDICARE

## 2025-08-19 VITALS
SYSTOLIC BLOOD PRESSURE: 148 MMHG | OXYGEN SATURATION: 96 % | WEIGHT: 146.7 LBS | HEART RATE: 92 BPM | BODY MASS INDEX: 26.83 KG/M2 | DIASTOLIC BLOOD PRESSURE: 66 MMHG

## 2025-08-19 DIAGNOSIS — Z87.19 HISTORY OF APPENDICITIS: Primary | ICD-10-CM

## 2025-08-19 PROCEDURE — 1111F DSCHRG MED/CURRENT MED MERGE: CPT | Performed by: SURGERY

## 2025-08-19 PROCEDURE — 99214 OFFICE O/P EST MOD 30 MIN: CPT | Performed by: SURGERY

## 2025-08-21 ENCOUNTER — HOSPITAL ENCOUNTER (OUTPATIENT)
Dept: RADIOLOGY | Facility: HOSPITAL | Age: OVER 89
Discharge: HOME | End: 2025-08-21
Payer: MEDICARE

## 2025-08-21 DIAGNOSIS — Z87.19 HISTORY OF APPENDICITIS: ICD-10-CM

## 2025-08-21 PROCEDURE — 74177 CT ABD & PELVIS W/CONTRAST: CPT | Performed by: STUDENT IN AN ORGANIZED HEALTH CARE EDUCATION/TRAINING PROGRAM

## 2025-08-21 PROCEDURE — 2550000001 HC RX 255 CONTRASTS: Performed by: SURGERY

## 2025-08-21 PROCEDURE — 74177 CT ABD & PELVIS W/CONTRAST: CPT

## 2025-08-21 RX ADMIN — IOHEXOL 75 ML: 350 INJECTION, SOLUTION INTRAVENOUS at 10:14

## 2025-08-26 ENCOUNTER — TELEMEDICINE (OUTPATIENT)
Dept: SURGERY | Facility: CLINIC | Age: OVER 89
End: 2025-08-26
Payer: MEDICARE

## 2025-08-26 DIAGNOSIS — K35.32 PERFORATED APPENDICITIS: Primary | ICD-10-CM

## 2025-08-26 PROCEDURE — 1111F DSCHRG MED/CURRENT MED MERGE: CPT | Performed by: SURGERY

## 2025-08-26 PROCEDURE — 98013 SYNCH AUDIO-ONLY EST LOW 20: CPT | Performed by: SURGERY
